# Patient Record
Sex: MALE | Race: BLACK OR AFRICAN AMERICAN | NOT HISPANIC OR LATINO | Employment: FULL TIME | URBAN - METROPOLITAN AREA
[De-identification: names, ages, dates, MRNs, and addresses within clinical notes are randomized per-mention and may not be internally consistent; named-entity substitution may affect disease eponyms.]

---

## 2023-01-23 PROBLEM — C79.9 METASTASIS OF UNKNOWN PRIMARY: Status: ACTIVE | Noted: 2023-01-23

## 2023-01-23 PROBLEM — R53.83 FATIGUE: Status: ACTIVE | Noted: 2023-01-23

## 2023-01-23 PROBLEM — I26.99 ACUTE PULMONARY EMBOLISM: Status: ACTIVE | Noted: 2023-01-23

## 2023-01-25 ENCOUNTER — HOSPITAL ENCOUNTER (INPATIENT)
Facility: HOSPITAL | Age: 63
LOS: 3 days | Discharge: HOME | DRG: 054 | End: 2023-01-28
Attending: EMERGENCY MEDICINE | Admitting: STUDENT IN AN ORGANIZED HEALTH CARE EDUCATION/TRAINING PROGRAM

## 2023-01-25 DIAGNOSIS — R07.9 CHEST PAIN: ICD-10-CM

## 2023-01-25 DIAGNOSIS — C79.31 BRAIN METASTASIS: Primary | ICD-10-CM

## 2023-01-25 DIAGNOSIS — I26.99 PULMONARY EMBOLISM: ICD-10-CM

## 2023-01-25 PROBLEM — R93.5 ABNORMAL CT OF THE ABDOMEN: Status: ACTIVE | Noted: 2023-01-25

## 2023-01-25 PROCEDURE — 25500020 PHARM REV CODE 255: Performed by: EMERGENCY MEDICINE

## 2023-01-25 PROCEDURE — 12000002 HC ACUTE/MED SURGE SEMI-PRIVATE ROOM

## 2023-01-25 PROCEDURE — 96374 THER/PROPH/DIAG INJ IV PUSH: CPT

## 2023-01-25 PROCEDURE — A9585 GADOBUTROL INJECTION: HCPCS | Performed by: EMERGENCY MEDICINE

## 2023-01-25 PROCEDURE — 63600175 PHARM REV CODE 636 W HCPCS: Performed by: PHYSICIAN ASSISTANT

## 2023-01-25 PROCEDURE — 99285 PR EMERGENCY DEPT VISIT,LEVEL V: ICD-10-PCS | Mod: ,,, | Performed by: PHYSICIAN ASSISTANT

## 2023-01-25 PROCEDURE — 99285 EMERGENCY DEPT VISIT HI MDM: CPT | Mod: 25

## 2023-01-25 PROCEDURE — 99285 EMERGENCY DEPT VISIT HI MDM: CPT | Mod: ,,, | Performed by: PHYSICIAN ASSISTANT

## 2023-01-25 RX ORDER — GADOBUTROL 604.72 MG/ML
10 INJECTION INTRAVENOUS
Status: COMPLETED | OUTPATIENT
Start: 2023-01-25 | End: 2023-01-25

## 2023-01-25 RX ORDER — LEVETIRACETAM 500 MG/5ML
1000 INJECTION, SOLUTION, CONCENTRATE INTRAVENOUS
Status: COMPLETED | OUTPATIENT
Start: 2023-01-25 | End: 2023-01-25

## 2023-01-25 RX ADMIN — GADOBUTROL 10 ML: 604.72 INJECTION INTRAVENOUS at 10:01

## 2023-01-25 RX ADMIN — LEVETIRACETAM 1000 MG: 100 INJECTION, SOLUTION INTRAVENOUS at 09:01

## 2023-01-25 NOTE — Clinical Note
Diagnosis: Brain metastasis [652133]   Admitting Provider:: PAU DAMON [[id number]]   Future Attending Provider: PAU DAMON [[id number]]   Reason for IP Medical Treatment  (Clinical interventions that can only be accomplished in the IP setting? ) :: new diagnosis of metastatic Ca, pulmonary artery thromboembolism and IVC thrombosis   Estimated Length of Stay:: 2 midnights   I certify that Inpatient services for greater than or equal to 2 midnights are medically necessary:: Yes   Plans for Post-Acute care--if anticipated (pick the single best option):: A. No post acute care anticipated at this time   Special Needs:: No Special Needs [1]

## 2023-01-26 PROBLEM — G93.6 VASOGENIC BRAIN EDEMA: Status: ACTIVE | Noted: 2023-01-26

## 2023-01-26 PROBLEM — C80.1 METASTASIS TO BRAIN OF UNKNOWN ORIGIN: Status: ACTIVE | Noted: 2023-01-26

## 2023-01-26 PROBLEM — C79.9 METASTASIS OF UNKNOWN PRIMARY: Status: ACTIVE | Noted: 2023-01-26

## 2023-01-26 PROBLEM — I82.220 IVC THROMBOSIS: Status: ACTIVE | Noted: 2023-01-26

## 2023-01-26 PROBLEM — C79.31 METASTASIS TO BRAIN OF UNKNOWN ORIGIN: Status: ACTIVE | Noted: 2023-01-26

## 2023-01-26 PROBLEM — I82.422: Status: ACTIVE | Noted: 2023-01-26

## 2023-01-26 LAB
ALBUMIN SERPL BCP-MCNC: 3.2 G/DL (ref 3.5–5.2)
ALP SERPL-CCNC: 80 U/L (ref 55–135)
ALT SERPL W/O P-5'-P-CCNC: 17 U/L (ref 10–44)
ANION GAP SERPL CALC-SCNC: 10 MMOL/L (ref 8–16)
AST SERPL-CCNC: 20 U/L (ref 10–40)
BACTERIA #/AREA URNS AUTO: NORMAL /HPF
BASOPHILS # BLD AUTO: 0.02 K/UL (ref 0–0.2)
BASOPHILS NFR BLD: 0.2 % (ref 0–1.9)
BILIRUB SERPL-MCNC: 0.6 MG/DL (ref 0.1–1)
BILIRUB UR QL STRIP: NEGATIVE
BUN SERPL-MCNC: 16 MG/DL (ref 8–23)
CA-I BLDV-SCNC: 1.25 MMOL/L (ref 1.06–1.42)
CALCIUM SERPL-MCNC: 10.1 MG/DL (ref 8.7–10.5)
CHLORIDE SERPL-SCNC: 105 MMOL/L (ref 95–110)
CK SERPL-CCNC: 191 U/L (ref 20–200)
CLARITY UR REFRACT.AUTO: CLEAR
CO2 SERPL-SCNC: 22 MMOL/L (ref 23–29)
COLOR UR AUTO: YELLOW
CREAT SERPL-MCNC: 1 MG/DL (ref 0.5–1.4)
DIFFERENTIAL METHOD: ABNORMAL
EOSINOPHIL # BLD AUTO: 0 K/UL (ref 0–0.5)
EOSINOPHIL NFR BLD: 0 % (ref 0–8)
ERYTHROCYTE [DISTWIDTH] IN BLOOD BY AUTOMATED COUNT: 14.2 % (ref 11.5–14.5)
EST. GFR  (NO RACE VARIABLE): >60 ML/MIN/1.73 M^2
GLUCOSE SERPL-MCNC: 134 MG/DL (ref 70–110)
GLUCOSE UR QL STRIP: NEGATIVE
HCT VFR BLD AUTO: 36.8 % (ref 40–54)
HGB BLD-MCNC: 11.5 G/DL (ref 14–18)
HGB UR QL STRIP: NEGATIVE
IMM GRANULOCYTES # BLD AUTO: 0.05 K/UL (ref 0–0.04)
IMM GRANULOCYTES NFR BLD AUTO: 0.6 % (ref 0–0.5)
KETONES UR QL STRIP: NEGATIVE
LDH SERPL L TO P-CCNC: 273 U/L (ref 110–260)
LEUKOCYTE ESTERASE UR QL STRIP: ABNORMAL
LYMPHOCYTES # BLD AUTO: 0.6 K/UL (ref 1–4.8)
LYMPHOCYTES NFR BLD: 6.9 % (ref 18–48)
MAGNESIUM SERPL-MCNC: 1.8 MG/DL (ref 1.6–2.6)
MCH RBC QN AUTO: 29.2 PG (ref 27–31)
MCHC RBC AUTO-ENTMCNC: 31.3 G/DL (ref 32–36)
MCV RBC AUTO: 93 FL (ref 82–98)
MICROSCOPIC COMMENT: NORMAL
MONOCYTES # BLD AUTO: 0.2 K/UL (ref 0.3–1)
MONOCYTES NFR BLD: 2.2 % (ref 4–15)
NEUTROPHILS # BLD AUTO: 7.7 K/UL (ref 1.8–7.7)
NEUTROPHILS NFR BLD: 90.1 % (ref 38–73)
NITRITE UR QL STRIP: NEGATIVE
NRBC BLD-RTO: 0 /100 WBC
PATH REV BLD -IMP: NORMAL
PH UR STRIP: 6 [PH] (ref 5–8)
PHOSPHATE SERPL-MCNC: 2.9 MG/DL (ref 2.7–4.5)
PLATELET # BLD AUTO: 315 K/UL (ref 150–450)
PMV BLD AUTO: 10.1 FL (ref 9.2–12.9)
POTASSIUM SERPL-SCNC: 4.7 MMOL/L (ref 3.5–5.1)
PROCALCITONIN SERPL IA-MCNC: 0.04 NG/ML
PROT SERPL-MCNC: 7.9 G/DL (ref 6–8.4)
PROT UR QL STRIP: ABNORMAL
PTH-INTACT SERPL-MCNC: 34.2 PG/ML (ref 9–77)
RBC # BLD AUTO: 3.94 M/UL (ref 4.6–6.2)
RBC #/AREA URNS AUTO: 2 /HPF (ref 0–4)
SODIUM SERPL-SCNC: 137 MMOL/L (ref 136–145)
SP GR UR STRIP: 1.03 (ref 1–1.03)
URATE SERPL-MCNC: 4.8 MG/DL (ref 3.4–7)
URN SPEC COLLECT METH UR: ABNORMAL
WBC # BLD AUTO: 8.5 K/UL (ref 3.9–12.7)
WBC #/AREA URNS AUTO: 5 /HPF (ref 0–5)

## 2023-01-26 PROCEDURE — 85060 PATHOLOGIST REVIEW: ICD-10-PCS | Mod: ,,, | Performed by: PATHOLOGY

## 2023-01-26 PROCEDURE — 80053 COMPREHEN METABOLIC PANEL: CPT | Performed by: HOSPITALIST

## 2023-01-26 PROCEDURE — 93010 ELECTROCARDIOGRAM REPORT: CPT | Mod: ,,, | Performed by: INTERNAL MEDICINE

## 2023-01-26 PROCEDURE — 81001 URINALYSIS AUTO W/SCOPE: CPT | Performed by: HOSPITALIST

## 2023-01-26 PROCEDURE — 83735 ASSAY OF MAGNESIUM: CPT | Performed by: HOSPITALIST

## 2023-01-26 PROCEDURE — 99232 PR SUBSEQUENT HOSPITAL CARE,LEVL II: ICD-10-PCS | Mod: ,,, | Performed by: PHYSICIAN ASSISTANT

## 2023-01-26 PROCEDURE — 99232 SBSQ HOSP IP/OBS MODERATE 35: CPT | Mod: ,,, | Performed by: PHYSICIAN ASSISTANT

## 2023-01-26 PROCEDURE — 99223 1ST HOSP IP/OBS HIGH 75: CPT | Mod: ,,, | Performed by: HOSPITALIST

## 2023-01-26 PROCEDURE — 99223 1ST HOSP IP/OBS HIGH 75: CPT | Mod: ,,, | Performed by: PHYSICIAN ASSISTANT

## 2023-01-26 PROCEDURE — 85025 COMPLETE CBC W/AUTO DIFF WBC: CPT | Performed by: HOSPITALIST

## 2023-01-26 PROCEDURE — 82330 ASSAY OF CALCIUM: CPT | Performed by: HOSPITALIST

## 2023-01-26 PROCEDURE — 84100 ASSAY OF PHOSPHORUS: CPT | Performed by: HOSPITALIST

## 2023-01-26 PROCEDURE — 63600175 PHARM REV CODE 636 W HCPCS: Performed by: HOSPITALIST

## 2023-01-26 PROCEDURE — 82550 ASSAY OF CK (CPK): CPT | Performed by: HOSPITALIST

## 2023-01-26 PROCEDURE — 93010 EKG 12-LEAD: ICD-10-PCS | Mod: ,,, | Performed by: INTERNAL MEDICINE

## 2023-01-26 PROCEDURE — 93005 ELECTROCARDIOGRAM TRACING: CPT

## 2023-01-26 PROCEDURE — 84145 PROCALCITONIN (PCT): CPT | Performed by: HOSPITALIST

## 2023-01-26 PROCEDURE — 82105 ALPHA-FETOPROTEIN SERUM: CPT | Performed by: STUDENT IN AN ORGANIZED HEALTH CARE EDUCATION/TRAINING PROGRAM

## 2023-01-26 PROCEDURE — 83615 LACTATE (LD) (LDH) ENZYME: CPT | Performed by: HOSPITALIST

## 2023-01-26 PROCEDURE — 25000003 PHARM REV CODE 250: Performed by: HOSPITALIST

## 2023-01-26 PROCEDURE — 85025 COMPLETE CBC W/AUTO DIFF WBC: CPT | Mod: 91 | Performed by: STUDENT IN AN ORGANIZED HEALTH CARE EDUCATION/TRAINING PROGRAM

## 2023-01-26 PROCEDURE — 85060 BLOOD SMEAR INTERPRETATION: CPT | Mod: ,,, | Performed by: PATHOLOGY

## 2023-01-26 PROCEDURE — 99223 PR INITIAL HOSPITAL CARE,LEVL III: ICD-10-PCS | Mod: ,,, | Performed by: HOSPITALIST

## 2023-01-26 PROCEDURE — 11000001 HC ACUTE MED/SURG PRIVATE ROOM

## 2023-01-26 PROCEDURE — 99223 PR INITIAL HOSPITAL CARE,LEVL III: ICD-10-PCS | Mod: ,,, | Performed by: PHYSICIAN ASSISTANT

## 2023-01-26 PROCEDURE — 84550 ASSAY OF BLOOD/URIC ACID: CPT | Performed by: HOSPITALIST

## 2023-01-26 PROCEDURE — 83970 ASSAY OF PARATHORMONE: CPT | Performed by: HOSPITALIST

## 2023-01-26 PROCEDURE — 84702 CHORIONIC GONADOTROPIN TEST: CPT | Performed by: STUDENT IN AN ORGANIZED HEALTH CARE EDUCATION/TRAINING PROGRAM

## 2023-01-26 RX ORDER — PROCHLORPERAZINE EDISYLATE 5 MG/ML
5 INJECTION INTRAMUSCULAR; INTRAVENOUS EVERY 6 HOURS PRN
Status: DISCONTINUED | OUTPATIENT
Start: 2023-01-26 | End: 2023-01-28 | Stop reason: HOSPADM

## 2023-01-26 RX ORDER — OXYCODONE HYDROCHLORIDE 10 MG/1
10 TABLET ORAL EVERY 6 HOURS PRN
Status: DISCONTINUED | OUTPATIENT
Start: 2023-01-26 | End: 2023-01-28 | Stop reason: HOSPADM

## 2023-01-26 RX ORDER — IBUPROFEN 200 MG
16 TABLET ORAL
Status: DISCONTINUED | OUTPATIENT
Start: 2023-01-26 | End: 2023-01-28 | Stop reason: HOSPADM

## 2023-01-26 RX ORDER — DEXAMETHASONE 4 MG/1
4 TABLET ORAL EVERY 6 HOURS
Status: DISCONTINUED | OUTPATIENT
Start: 2023-01-26 | End: 2023-01-28 | Stop reason: HOSPADM

## 2023-01-26 RX ORDER — SODIUM CHLORIDE 0.9 % (FLUSH) 0.9 %
10 SYRINGE (ML) INJECTION EVERY 6 HOURS PRN
Status: DISCONTINUED | OUTPATIENT
Start: 2023-01-26 | End: 2023-01-28 | Stop reason: HOSPADM

## 2023-01-26 RX ORDER — NALOXONE HCL 0.4 MG/ML
0.02 VIAL (ML) INJECTION
Status: DISCONTINUED | OUTPATIENT
Start: 2023-01-26 | End: 2023-01-28 | Stop reason: HOSPADM

## 2023-01-26 RX ORDER — ACETAMINOPHEN 325 MG/1
650 TABLET ORAL EVERY 4 HOURS PRN
Status: DISCONTINUED | OUTPATIENT
Start: 2023-01-26 | End: 2023-01-28 | Stop reason: HOSPADM

## 2023-01-26 RX ORDER — ENOXAPARIN SODIUM 100 MG/ML
1 INJECTION SUBCUTANEOUS
Status: DISCONTINUED | OUTPATIENT
Start: 2023-01-26 | End: 2023-01-28 | Stop reason: HOSPADM

## 2023-01-26 RX ORDER — POLYETHYLENE GLYCOL 3350 17 G/17G
17 POWDER, FOR SOLUTION ORAL 2 TIMES DAILY PRN
Status: DISCONTINUED | OUTPATIENT
Start: 2023-01-26 | End: 2023-01-28 | Stop reason: HOSPADM

## 2023-01-26 RX ORDER — ONDANSETRON 2 MG/ML
4 INJECTION INTRAMUSCULAR; INTRAVENOUS EVERY 8 HOURS PRN
Status: DISCONTINUED | OUTPATIENT
Start: 2023-01-26 | End: 2023-01-28 | Stop reason: HOSPADM

## 2023-01-26 RX ORDER — IBUPROFEN 200 MG
24 TABLET ORAL
Status: DISCONTINUED | OUTPATIENT
Start: 2023-01-26 | End: 2023-01-28 | Stop reason: HOSPADM

## 2023-01-26 RX ORDER — LEVETIRACETAM 500 MG/1
500 TABLET ORAL 2 TIMES DAILY
Status: DISCONTINUED | OUTPATIENT
Start: 2023-01-26 | End: 2023-01-28 | Stop reason: HOSPADM

## 2023-01-26 RX ORDER — AMOXICILLIN 250 MG
1 CAPSULE ORAL DAILY PRN
Status: DISCONTINUED | OUTPATIENT
Start: 2023-01-26 | End: 2023-01-28 | Stop reason: HOSPADM

## 2023-01-26 RX ORDER — FAMOTIDINE 20 MG/1
20 TABLET, FILM COATED ORAL
Status: DISCONTINUED | OUTPATIENT
Start: 2023-01-26 | End: 2023-01-28 | Stop reason: HOSPADM

## 2023-01-26 RX ORDER — TALC
6 POWDER (GRAM) TOPICAL NIGHTLY PRN
Status: DISCONTINUED | OUTPATIENT
Start: 2023-01-26 | End: 2023-01-28 | Stop reason: HOSPADM

## 2023-01-26 RX ORDER — IPRATROPIUM BROMIDE AND ALBUTEROL SULFATE 2.5; .5 MG/3ML; MG/3ML
3 SOLUTION RESPIRATORY (INHALATION) EVERY 4 HOURS PRN
Status: DISCONTINUED | OUTPATIENT
Start: 2023-01-26 | End: 2023-01-28 | Stop reason: HOSPADM

## 2023-01-26 RX ORDER — GLUCAGON 1 MG
1 KIT INJECTION
Status: DISCONTINUED | OUTPATIENT
Start: 2023-01-26 | End: 2023-01-28 | Stop reason: HOSPADM

## 2023-01-26 RX ADMIN — DEXAMETHASONE 4 MG: 4 TABLET ORAL at 01:01

## 2023-01-26 RX ADMIN — DEXAMETHASONE 4 MG: 4 TABLET ORAL at 05:01

## 2023-01-26 RX ADMIN — LEVETIRACETAM 500 MG: 500 TABLET, FILM COATED ORAL at 09:01

## 2023-01-26 RX ADMIN — ENOXAPARIN SODIUM 90 MG: 100 INJECTION SUBCUTANEOUS at 01:01

## 2023-01-26 RX ADMIN — DEXAMETHASONE 4 MG: 4 TABLET ORAL at 12:01

## 2023-01-26 RX ADMIN — ENOXAPARIN SODIUM 90 MG: 100 INJECTION SUBCUTANEOUS at 04:01

## 2023-01-26 RX ADMIN — FAMOTIDINE 20 MG: 20 TABLET ORAL at 05:01

## 2023-01-26 RX ADMIN — FAMOTIDINE 20 MG: 20 TABLET ORAL at 04:01

## 2023-01-26 NOTE — SUBJECTIVE & OBJECTIVE
History reviewed. No pertinent past medical history.    History reviewed. No pertinent surgical history.    Review of patient's allergies indicates:  No Known Allergies    Current Facility-Administered Medications on File Prior to Encounter   Medication    [COMPLETED] dexAMETHasone injection 8 mg    [DISCONTINUED] acetaminophen tablet 650 mg    [DISCONTINUED] ondansetron injection 4 mg    [DISCONTINUED] sodium chloride 0.9% flush 10 mL    [DISCONTINUED] tuberculin injection 5 Units     No current outpatient medications on file prior to encounter.     Family History       Problem Relation (Age of Onset)    Diverticulitis Mother    Prostate cancer Brother          Tobacco Use    Smoking status: Former     Packs/day: 0.00     Types: Cigarettes    Smokeless tobacco: Never    Tobacco comments:     Rare cigarette use in his teens.   Substance and Sexual Activity    Alcohol use: Yes     Alcohol/week: 6.0 standard drinks     Types: 6 Standard drinks or equivalent per week    Drug use: Not Currently     Types: Marijuana     Comment: THC in his teens    Sexual activity: Not on file     Review of Systems   Constitutional:  Negative for unexpected weight change.   HENT:  Negative for trouble swallowing and voice change.    Eyes: Negative.  Negative for visual disturbance.   Cardiovascular: Negative.    Gastrointestinal: Negative.    Genitourinary: Negative.    Skin:  Negative for rash.   Neurological:  Negative for dizziness, speech difficulty and numbness.   Hematological:  Negative for adenopathy. Does not bruise/bleed easily.   Psychiatric/Behavioral:  Negative for agitation and confusion.    All other systems reviewed and are negative.  Objective:     Vital Signs (Most Recent):  Temp: 99.2 °F (37.3 °C) (01/25/23 2025)  Pulse: 82 (01/26/23 0030)  Resp: 20 (01/26/23 0030)  BP: 129/72 (01/26/23 0030)  SpO2: 96 % (01/26/23 0030) Vital Signs (24h Range):  Temp:  [97.8 °F (36.6 °C)-99.2 °F (37.3 °C)] 99.2 °F (37.3 °C)  Pulse:   [81-96] 82  Resp:  [16-20] 20  SpO2:  [95 %-98 %] 96 %  BP: (120-181)/(69-95) 129/72     Weight: 90.7 kg (200 lb)  Body mass index is 31.32 kg/m².    Physical Exam  Vitals and nursing note reviewed.   Constitutional:       General: He is not in acute distress.     Appearance: He is not ill-appearing.      Comments: Some temporal wasting   HENT:      Head: Normocephalic and atraumatic.      Comments: Left frontal subcutaneous nodule     Mouth/Throat:      Pharynx: Oropharynx is clear. No oropharyngeal exudate.      Comments: Missing teeth  Eyes:      General: No scleral icterus.     Extraocular Movements: Extraocular movements intact.      Pupils: Pupils are equal, round, and reactive to light.      Comments: No nystagmus   Cardiovascular:      Rate and Rhythm: Normal rate and regular rhythm.      Pulses: Normal pulses.      Heart sounds: Murmur heard.   Pulmonary:      Effort: Pulmonary effort is normal. No respiratory distress.      Comments: Some coarse expiratory breath sounds bilaterally decreased breath sounds at the bases  Abdominal:      General: There is no distension.      Palpations: Abdomen is soft.      Tenderness: There is no abdominal tenderness. There is no guarding.   Musculoskeletal:      Cervical back: Neck supple. No tenderness.      Right lower leg: No edema.      Left lower leg: No edema.   Lymphadenopathy:      Cervical: No cervical adenopathy.   Skin:     General: Skin is warm and dry.   Neurological:      General: No focal deficit present.      Mental Status: He is alert and oriented to person, place, and time.      Comments: No asterixis   Psychiatric:         Mood and Affect: Mood normal.         Behavior: Behavior normal.         CRANIAL NERVES     CN III, IV, VI   Pupils are equal, round, and reactive to light.     Significant Labs: All pertinent labs within the past 24 hours have been reviewed.  CBC:   Recent Labs   Lab 01/25/23  1518   WBC 8.36   HGB 13.2*   HCT 42.0        CMP:    Recent Labs   Lab 01/25/23  1518      K 4.1      CO2 24   GLU 87   BUN 18   CREATININE 1.2   CALCIUM 9.8   PROT 8.2   ALBUMIN 3.3*   BILITOT 0.7   ALKPHOS 75   AST 26   ALT 20   ANIONGAP 12     Cardiac Markers: No results for input(s): CKMB, MYOGLOBIN, BNP, TROPISTAT in the last 48 hours.  Coagulation:   Recent Labs   Lab 01/25/23  1518   INR 1.1     Lactic Acid: No results for input(s): LACTATE in the last 48 hours.  Lipase: No results for input(s): LIPASE in the last 48 hours.  Magnesium: No results for input(s): MG in the last 48 hours.  Troponin: No results for input(s): TROPONINI, TROPONINIHS in the last 48 hours.    Significant Imaging: I have reviewed all pertinent imaging results/findings within the past 24 hours.    MRI BRAIN W WO CONTRAST     CLINICAL HISTORY:  Brain metastases suspected;.     TECHNIQUE:  Multiplanar multisequence MR imaging of the brain was performed before and after the administration of 10 mL Gadavist  intravenous contrast.     COMPARISON:  CT 01/25/2023     FINDINGS:  Intracranial compartment:     No midline shift or hydrocephalus.  No extra-axial blood or fluid collections.     There are multiple bilateral metastatic foci involving the cerebrum and cerebellum.  Some are homogeneous enhancing a others are heterogeneous with ring enhancement.  The largest measures approximately 3.7 x 3.1 cm in the right temporal lobe on axial 12 of series 14..  There are lesions within the bilateral frontal lobes, bilateral temporal lobes, bilateral parietal lobes and bilateral cerebellum.     There are multiple peripheral soft tissue masses in the scalp and posterior paraspinal musculature in the upper neck with associated enhancement suggesting metastatic disease.     No acute hemorrhage is detected.  No evidence of acute major vascular infarction.     There is mild associated vasogenic edema adjacent to the lesion the right temporal lobe right parietal lobe near the apex and minimally  left temporal lobe and bilateral cerebellum.     Small probable remote lacunar infarct of the centrum semiovale on the right.     Mild involutional changes and chronic microvascular ischemic changes in the periventricular white matter.     Normal vascular flow voids are preserved.     Skull/extracranial contents (limited evaluation): Bone marrow signal intensity is normal.     Impression:     1. Multiple cerebral and cerebellar masses consistent with metastatic disease.  The largest is in the right temporal lobe measuring approximately 3.7 cm.  Mild associated vasogenic edema.  Follow-up recommended.  2. Multiple small enhancing masses in the scalp and posterior paraspinal musculature in the upper neck suggesting metastatic disease.  3. Involutional changes chronic microvascular ischemic changes and small remote lacunar infarct of the right centrum semiovale.  4.  This report was flagged in Epic as abnormal.        Electronically signed by: Javier Garland  Date:                                            01/25/2023  Time:                                           23:42    CT CHEST ABDOMEN PELVIS WITH CONTRAST (XPD)     CLINICAL HISTORY:  Metastatic disease evaluation;     TECHNIQUE:  Axial images were obtained of the chest, abdomen and pelvis from the thoracic inlet through the symphysis pubis after the administration intravenous contrast.     COMPARISON:  No relevant prior imaging examinations are available for correlation.     FINDINGS:  Mediastinum, hilar and axillary regions:     There are moderately prominent lymph nodes in the mediastinum and hilar regions.  There are no pleural or pericardial effusions.     There is low-density thrombus in a segmental branch of the left lower lobe pulmonary artery as seen on axial image 45 and series 2.     There are multiple soft tissue density masses in the subcutaneous fat of the chest, the largest of which is in the right anterior chest wall and measures 2.2 cm in  greatest dimension.     Lungs:     There are numerable soft tissue density masses throughout the lungs bilaterally.     Abdomen and pelvis:     There are multiple low-density masses involving the liver suggestive of metastatic disease.  A small low-density lesion is noted in the posterior aspect of the upper portion of the spleen.     The pancreas, gallbladder and adrenal glands are unremarkable.  There are multiple masses in the kidneys bilaterally the largest of which measures 3.6 cm in the left kidney.     There is low-density thrombus in the inferior vena cava near the origin of the left common iliac vein as seen on axial image 44 and series 3.     There is a large heterogeneous mass in the central portion of the pelvis measuring 12 cm in greatest dimension and displaces the urinary bladder anteriorly.  The appendix is normal.     There are multiple enlarged lymph nodes in the mesentery measuring up to 3.6 cm in greatest dimension.     Multiple masses are noted in the subcutaneous fat of the abdomen and pelvis.  Phleboliths are noted in the pelvis.     There are masses in the left ischial rectal fossa as well as in the perirectal region.     Delayed images:     There is contrast excretion into both renal pelvocaliceal systems and contrast accumulation in the urinary bladder.     Osseous structures:     There are lytic lesions involving the right 3rd, 7th and 11th ribs and the L2, L4 and L5 vertebrae as well as the medial aspect of the left iliac bone lateral to the inferior margin of the sacroiliac joint..     An additional lytic lesion is noted involving the medial aspect of the right scapula.     Impression:     Multiple masses in the lungs and in the soft tissues of the chest, abdomen and pelvis suggestive of metastatic disease.     Large heterogeneous mass in the central portion of the pelvis measuring 12 cm suggestive of a neoplasm.     Enlarged lymph nodes in the mesentery and moderately enlarged lymph  nodes in the mediastinal and hilar regions and the perirectal region as well as in the left ischiorectal fossa.     Low-density thrombus in a segmental branch of the left lower lobe pulmonary artery as well as in the inferior vena cava near the origin of the left common iliac vein.     Masses in the kidneys and liver suggestive of metastatic disease.     Multiple lytic lesions in the axial and appendicular skeleton suggestive of metastatic disease.     This report was called to the emergency department physician assistant at 17:21 on January 23, 2023.        Electronically signed by: Domingo Rodriguez MD  Date:                                            01/23/2023  Time:                                           17:24

## 2023-01-26 NOTE — PROGRESS NOTES
[facility] - Emergency Dept  Neurosurgery  Progress Note    Subjective:     History of Present Illness: Pt is a 63M no past medical history presents with chronic fatigue and weight loss to outside hospital. Workup showed significant metastatic tumor burden and CTH showed multiple brain metastases. Pt is neurologically intact, transferred to [facility] for further workup and evaluation.      Interval History:NAEON. AFVSS. Denies hx of seizures, headache, pain, changes in vision or speech. Endorses only feeling of fatigue, but not shortness of breath or trouble breathing. No numbness/tingling. Endorses understanding of tumor burden and next steps to get a proper diagnosis, pending biopsy.    Medications:  Continuous Infusions:  Scheduled Meds:   dexAMETHasone  4 mg Oral Q6H    enoxaparin  1 mg/kg Subcutaneous Q12H    famotidine  20 mg Oral BID AC    levETIRAcetam  500 mg Oral BID     PRN Meds:acetaminophen, albuterol-ipratropium, dextrose 10%, dextrose 10%, glucagon (human recombinant), glucose, glucose, melatonin, naloxone, ondansetron, oxyCODONE, polyethylene glycol, prochlorperazine, senna-docusate 8.6-50 mg, sodium chloride 0.9%     Review of Systems   Constitutional:  Positive for fatigue and unexpected weight change. Negative for chills and fever.   Eyes:  Negative for photophobia and visual disturbance.   Respiratory:  Negative for cough and shortness of breath.    Cardiovascular:  Negative for chest pain and palpitations.   Gastrointestinal:  Negative for nausea and vomiting.   Neurological:  Negative for seizures, speech difficulty, weakness, numbness and headaches.   Psychiatric/Behavioral:  Negative for agitation and confusion.    Objective:     Weight: 90.7 kg (200 lb)  Body mass index is 31.32 kg/m².  Vital Signs (Most Recent):  Temp: 98.8 °F (37.1 °C) (01/26/23 0906)  Pulse: 82 (01/26/23 0906)  Resp: 18 (01/26/23 0906)  BP: 126/77 (01/26/23 0906)  SpO2: 96 % (01/26/23 0906) Vital Signs (24h Range):  Temp:  [97.8  "°F (36.6 °C)-99.2 °F (37.3 °C)] 98.8 °F (37.1 °C)  Pulse:  [77-96] 82  Resp:  [16-20] 18  SpO2:  [95 %-99 %] 96 %  BP: (120-181)/(69-95) 126/77                   Neurosurgery Physical Exam    General: well developed, no distress  Head: normocephalic, atraumatic  Pulmonary: normal respirations, no signs of respiratory distress  Abdomen: soft, non-distended  Skin: skin is warm, dry and intact  E4V5M6  Awake, alert, Ox4  PERRLA, EOMI  CNII-XII grossly intact  Motor: follows commands full strength x4  SILT  No drift or dysmetria    Significant Labs:  Recent Labs   Lab 01/25/23  1518 01/26/23  0445   GLU 87 134*    137   K 4.1 4.7    105   CO2 24 22*   BUN 18 16   CREATININE 1.2 1.0   CALCIUM 9.8 10.1   MG  --  1.8     Recent Labs   Lab 01/25/23  1518 01/26/23  0445   WBC 8.36 8.50   HGB 13.2* 11.5*   HCT 42.0 36.8*    315     Recent Labs   Lab 01/25/23  1518   INR 1.1     Microbiology Results (last 7 days)       ** No results found for the last 168 hours. **          All pertinent labs from the last 24 hours have been reviewed.    Significant Diagnostics:  I have reviewed all pertinent imaging results/findings within the past 24 hours.  Assessment/Plan:     * Multiple lesions of metastatic malignancy  Pt is a 63M no past medical history presents with chronic fatigue and weight loss to outside hospital. Workup showed significant metastatic tumor burden and CTH showed multiple brain metastases. Pt is neurologically intact, transferred to [facility] for further workup and evaluation.    Admitted to  service 1/25    MRI brain w w/o contrast reviewed, showed "Multiple cerebral and cerebellar masses consistent with metastatic disease.  The largest is in the right temporal lobe measuring approximately 3.7 cm.  Mild associated vasogenic edema. Multiple small enhancing masses in the scalp and posterior paraspinal musculature in the upper neck suggesting metastatic disease. Involutional changes chronic microvascular " "ischemic changes and small remote lacunar infarct of the right centrum semiovale".     Plan:  Q4h neurochecks  SBP<160, HOB>30  Loaded with Dex, continue 4q6  Loaded with Keppra, continue 500 BID  Given number of brain lesions, likely recommend whole brain radiation treatment. Surgery not recommended at this time. Recommend Rad/Onc consult for possible radiation treatment.  Recommend oncology consult and preferred route for obtaining biopsy  Recommend IR consult for biopsy as well        Joycelyn Tavarez PA-C  Neurosurgery  [facility] - Emergency Dept  "

## 2023-01-26 NOTE — HPI
"Patient is a 63 year-old male with no known PMH who presents to [facility] [place] as a transfer following new findings at OSH concerning for metastatic CNS Disease requiring a higher level of care. The patient originally presented to [facility] on 1/23/23 for a clinical presentation of faigue, progressive weakness, cough, and overall change in performance status. Further evaluation with CXR was concerning for "Innumerable nodular opacities throughout both lungs very concerning for metastatic disease to lungs." Furhter work-up was obtained with CT C/A/P which was consistent with malignancy of unknown primary with "Multiple masses in the lungs and in the soft tissues of the chest, abdomen and pelvis suggestive of metastatic disease, Large heterogeneous mass in the central portion of the pelvis measuring 12 cm suggestive of a neoplasm, Enlarged lymph nodes in the mesentery and moderately enlarged lymph nodes in the mediastinal and hilar regions and the perirectal region as well as in the left ischiorectal fossa, Multiple lytic lesions in the axial and appendicular skeleton suggestive of metastatic disease." The patient ended up leaving [place] after receiving these results and presented back on 1/25/23 due to worsening fatigue and at the suggestion of his family. Due to reports of worsening VARGAS, CT Brain was obtained showing "Multiple subtle ring and nodular lesions with mild associated vasogenic edema involving the cerebrum bilaterally and cerebellum as above presumed metastatic disease to brain as seen on this noncontrast CT." The patient was transferred to [facility] and evaluated by Neurosurgery in the ED with MRI Brain ordered, and recs for Keppra and Dexamethasone for seizure PPX and edema management. MRI brain resulting with "Multiple cerebral and cerebellar masses consistent with metastatic disease.  The largest is in the right temporal lobe measuring approximately 3.7 cm.  Mild associated vasogenic edema." " Oncology consulted for assistance with work-up for Malignancy of unknown primary. At the bedside, patient is vitally stable and in no acute distress. He denies any previous history of malignancy, known toxic exposures, tobacco use, or substantial ETOH use. He was updated regarding the findings above and the overall clinical picture being malignancy of unknown primary and the incurable nature of this and was agreeable and understanding. We discussed options for identifying his malignancy in order to evaluate for any treatment options and was agreeable.

## 2023-01-26 NOTE — SUBJECTIVE & OBJECTIVE
Interval History:NAEON. AFVSS. Denies hx of seizures, headache, pain, changes in vision or speech. Endorses only feeling of fatigue, but not shortness of breath or trouble breathing. No numbness/tingling. Endorses understanding of tumor burden and next steps to get a proper diagnosis, pending biopsy.    Medications:  Continuous Infusions:  Scheduled Meds:   dexAMETHasone  4 mg Oral Q6H    enoxaparin  1 mg/kg Subcutaneous Q12H    famotidine  20 mg Oral BID AC    levETIRAcetam  500 mg Oral BID     PRN Meds:acetaminophen, albuterol-ipratropium, dextrose 10%, dextrose 10%, glucagon (human recombinant), glucose, glucose, melatonin, naloxone, ondansetron, oxyCODONE, polyethylene glycol, prochlorperazine, senna-docusate 8.6-50 mg, sodium chloride 0.9%     Review of Systems   Constitutional:  Positive for fatigue and unexpected weight change. Negative for chills and fever.   Eyes:  Negative for photophobia and visual disturbance.   Respiratory:  Negative for cough and shortness of breath.    Cardiovascular:  Negative for chest pain and palpitations.   Gastrointestinal:  Negative for nausea and vomiting.   Neurological:  Negative for seizures, speech difficulty, weakness, numbness and headaches.   Psychiatric/Behavioral:  Negative for agitation and confusion.    Objective:     Weight: 90.7 kg (200 lb)  Body mass index is 31.32 kg/m².  Vital Signs (Most Recent):  Temp: 98.8 °F (37.1 °C) (01/26/23 0906)  Pulse: 82 (01/26/23 0906)  Resp: 18 (01/26/23 0906)  BP: 126/77 (01/26/23 0906)  SpO2: 96 % (01/26/23 0906) Vital Signs (24h Range):  Temp:  [97.8 °F (36.6 °C)-99.2 °F (37.3 °C)] 98.8 °F (37.1 °C)  Pulse:  [77-96] 82  Resp:  [16-20] 18  SpO2:  [95 %-99 %] 96 %  BP: (120-181)/(69-95) 126/77                   Neurosurgery Physical Exam    General: well developed, no distress  Head: normocephalic, atraumatic  Pulmonary: normal respirations, no signs of respiratory distress  Abdomen: soft, non-distended  Skin: skin is warm, dry  and intact  E4V5M6  Awake, alert, Ox4  PERRLA, EOMI  CNII-XII grossly intact  Motor: follows commands full strength x4  SILT  No drift or dysmetria    Significant Labs:  Recent Labs   Lab 01/25/23  1518 01/26/23  0445   GLU 87 134*    137   K 4.1 4.7    105   CO2 24 22*   BUN 18 16   CREATININE 1.2 1.0   CALCIUM 9.8 10.1   MG  --  1.8     Recent Labs   Lab 01/25/23  1518 01/26/23  0445   WBC 8.36 8.50   HGB 13.2* 11.5*   HCT 42.0 36.8*    315     Recent Labs   Lab 01/25/23  1518   INR 1.1     Microbiology Results (last 7 days)       ** No results found for the last 168 hours. **          All pertinent labs from the last 24 hours have been reviewed.    Significant Diagnostics:  I have reviewed all pertinent imaging results/findings within the past 24 hours.

## 2023-01-26 NOTE — ED NOTES
LOC: The patient is awake, alert, and oriented to self, place, time, and situation. Pt is calm and cooperative. Affect is appropriate.  Speech is appropriate and clear.     APPEARANCE: Patient resting comfortably in no acute distress.  Patient is clean and well groomed.    SKIN: The skin is warm and dry; color consistent with ethnicity.  Patient has normal skin turgor and moist mucus membranes.  Skin intact; no breakdown or bruising noted.     MUSCULOSKELETAL: Patient moving upper and lower extremities without difficulty; denies pain in the extremities or back.  Denies weakness.     RESPIRATORY: Airway is open and patent. Respirations spontaneous, even, easy, and non-labored.  Patient has a normal effort and rate.  No accessory muscle use noted. Denies cough.     CARDIAC:   No peripheral edema noted. No complaints of chest pain.      ABDOMEN: Soft and non tender to palpation.  No distention noted. Pt denies abdominal pain; denies nausea, vomiting, diarrhea, or constipation.    NEUROLOGIC: Eyes open spontaneously.  Behavior appropriate to situation.  Follows commands; facial expression symmetrical.  Purposeful motor response noted; normal sensation in all extremities. Pt denies headache; denies lightheadedness or dizziness; denies visual disturbances; denies loss of balance; denies unilateral weakness.

## 2023-01-26 NOTE — ASSESSMENT & PLAN NOTE
Seen by neurosurgery  -no operative intervention recommended  -no focal neurologic deficits noted   -continue Dexamethasone 4mg PO q6hrs   >famotidine stress ulcer prophylaxis  -continue Keppra 500mg PO BID as seizure prophylaxis, fall/seizure precautions

## 2023-01-26 NOTE — HPI
Pt is a 63M no past medical history presents with chronic fatigue and weight loss to outside hospital. Workup showed significant metastatic tumor burden and CTH showed multiple brain metastases. Pt is neurologically intact, transferred to [facility] for further workup and evaluation.

## 2023-01-26 NOTE — ASSESSMENT & PLAN NOTE
Presented as noted above on 1/23/ & 1/25 with findings of metastatic disease of unknown primary  Transferred for higher level of care in setting of CNS metastatic disease  No previous history of malignancy per patient history  Broad differential at this time extent of metastatic disease and various locations  Recommendations:  -Follow-up neurosurgery recs, no plans for surgery at this time  -Follow-up Rad/Onc recs for WBRT evaluation  -Patient with diffuse pulmonary disease, lytic lesions, as well as 12 cm pelvic mass that could be amendable to biopsy, IR consulted. Would ideally have prior to DC to expedite care  -CEA, CA-19-9, PSA, AFP, B-HCG ordered for initial evaluation  -Oncology will continue to follow pending pathology and can assist with discharge follow-up once primary identified  -Patient updated regarding overall picture of metastatic disease of unknown primary and the incurable nature of this and was understanding, wishes to proceed with further evaluation  -Symptomatic management per primary team

## 2023-01-26 NOTE — H&P
[facility] - Emergency Dept  [facility]  History & Physical    Patient Name: Daryle Lynn Howard  MRN: [id number]  Patient Class: IP- Inpatient  Admission Date: 1/25/2023  Attending Physician: Ba Rainey* [facility]  Admitting Physician: Demian Edmond MD  Primary Care Provider: No primary care provider on file.         Patient information was obtained from patient, past medical records and ER records.     Subjective:     Principal Problem:Multiple lesions of metastatic malignancy    Chief Complaint:   Chief Complaint   Patient presents with    Transfer     Transfer from [facility] for neuro consult. Feeling fatigue since christmas. CT abdomen/pelvis/brain shows metastatic CA. No hx. Alert/oriented x 4        HPI: 63-year-old man with no chronic medical history not receiving outpatient medical care for last 20 years presents as a transfer from [facility] for concerns of metastatic malignancy of unknown primary with brain metastases with vasogenic edema.     He was initially seen January 23rd in the ED department at [facility], presenting with complaints of 1 month of fatigue, noted to have rales on pulmonary exam initial chest x-ray showed innumerable pulmonary masses and he had a subsequent CT chest abdomen pelvis that has revealed numerous pulmonary lesions, as well as abdominal lesions, lytic bone lesions and a large pelvic mass all concerning for signs of a metastatic malignancy, and 3 areas of venous thromboembolism.  He had no prior surgeries, no prior history is of a malignancy diagnosis, he lives alone and works at [employer].  No history of tob,acco use or smoking, drinking approximately 6 alcoholic drinks per week and denies any history of drug use, no prior  service no report of chemical exposures and or asbestos exposures in his working career.    He was plan for admission but ultimately left against medical advise for personal reasons and then re-presented to the  emergency department there in home today and a CT head was completed which showed brain lesions with associated vasogenic edema and a and a transfer was requested and Neurosurgery recommended the ED to ED transfer.    On discussion with patient besides fatigue and occasional cough and some shortness of breath with exertion that is been occurring since [place] he denies any other symptoms such as chest pain, nausea vomiting, fevers chills myalgias and or unexpected weight loss he has not had appetite changes denies any bone or extremity pain also denies any bowel symptoms and or lower urinary tract symptoms.  States he has seen part of his imaging at the prior hospital offered to review imaging with patient this evening, discussed plan for steroids, seizure medications and anticoagulation    ED treatment, Decadron 8 mg IV prior to arrival, Keppra 1 g IV since arrival      History reviewed. No pertinent past medical history.    History reviewed. No pertinent surgical history.    Review of patient's allergies indicates:  No Known Allergies    Current Facility-Administered Medications on File Prior to Encounter   Medication    [COMPLETED] dexAMETHasone injection 8 mg    [DISCONTINUED] acetaminophen tablet 650 mg    [DISCONTINUED] ondansetron injection 4 mg    [DISCONTINUED] sodium chloride 0.9% flush 10 mL    [DISCONTINUED] tuberculin injection 5 Units     No current outpatient medications on file prior to encounter.     Family History       Problem Relation (Age of Onset)    Diverticulitis Mother    Prostate cancer Brother          Tobacco Use    Smoking status: Former     Packs/day: 0.00     Types: Cigarettes    Smokeless tobacco: Never    Tobacco comments:     Rare cigarette use in his teens.   Substance and Sexual Activity    Alcohol use: Yes     Alcohol/week: 6.0 standard drinks     Types: 6 Standard drinks or equivalent per week    Drug use: Not Currently     Types: Marijuana     Comment: THC in his  teens    Sexual activity: Not on file     Review of Systems   Constitutional:  Negative for unexpected weight change.   HENT:  Negative for trouble swallowing and voice change.    Eyes: Negative.  Negative for visual disturbance.   Cardiovascular: Negative.    Gastrointestinal: Negative.    Genitourinary: Negative.    Skin:  Negative for rash.   Neurological:  Negative for dizziness, speech difficulty and numbness.   Hematological:  Negative for adenopathy. Does not bruise/bleed easily.   Psychiatric/Behavioral:  Negative for agitation and confusion.    All other systems reviewed and are negative.  Objective:     Vital Signs (Most Recent):  Temp: 99.2 °F (37.3 °C) (01/25/23 2025)  Pulse: 82 (01/26/23 0030)  Resp: 20 (01/26/23 0030)  BP: 129/72 (01/26/23 0030)  SpO2: 96 % (01/26/23 0030) Vital Signs (24h Range):  Temp:  [97.8 °F (36.6 °C)-99.2 °F (37.3 °C)] 99.2 °F (37.3 °C)  Pulse:  [81-96] 82  Resp:  [16-20] 20  SpO2:  [95 %-98 %] 96 %  BP: (120-181)/(69-95) 129/72     Weight: 90.7 kg (200 lb)  Body mass index is 31.32 kg/m².    Physical Exam  Vitals and nursing note reviewed.   Constitutional:       General: He is not in acute distress.     Appearance: He is not ill-appearing.      Comments: Some temporal wasting   HENT:      Head: Normocephalic and atraumatic.      Comments: Left frontal subcutaneous nodule     Mouth/Throat:      Pharynx: Oropharynx is clear. No oropharyngeal exudate.      Comments: Missing teeth  Eyes:      General: No scleral icterus.     Extraocular Movements: Extraocular movements intact.      Pupils: Pupils are equal, round, and reactive to light.      Comments: No nystagmus   Cardiovascular:      Rate and Rhythm: Normal rate and regular rhythm.      Pulses: Normal pulses.      Heart sounds: Murmur heard.   Pulmonary:      Effort: Pulmonary effort is normal. No respiratory distress.      Comments: Some coarse expiratory breath sounds bilaterally decreased breath sounds at the  bases  Abdominal:      General: There is no distension.      Palpations: Abdomen is soft.      Tenderness: There is no abdominal tenderness. There is no guarding.   Musculoskeletal:      Cervical back: Neck supple. No tenderness.      Right lower leg: No edema.      Left lower leg: No edema.   Lymphadenopathy:      Cervical: No cervical adenopathy.   Skin:     General: Skin is warm and dry.   Neurological:      General: No focal deficit present.      Mental Status: He is alert and oriented to person, place, and time.      Comments: No asterixis   Psychiatric:         Mood and Affect: Mood normal.         Behavior: Behavior normal.         CRANIAL NERVES     CN III, IV, VI   Pupils are equal, round, and reactive to light.     Significant Labs: All pertinent labs within the past 24 hours have been reviewed.  CBC:   Recent Labs   Lab 01/25/23  1518   WBC 8.36   HGB 13.2*   HCT 42.0        CMP:   Recent Labs   Lab 01/25/23  1518      K 4.1      CO2 24   GLU 87   BUN 18   CREATININE 1.2   CALCIUM 9.8   PROT 8.2   ALBUMIN 3.3*   BILITOT 0.7   ALKPHOS 75   AST 26   ALT 20   ANIONGAP 12     Cardiac Markers: No results for input(s): CKMB, MYOGLOBIN, BNP, TROPISTAT in the last 48 hours.  Coagulation:   Recent Labs   Lab 01/25/23  1518   INR 1.1     Lactic Acid: No results for input(s): LACTATE in the last 48 hours.  Lipase: No results for input(s): LIPASE in the last 48 hours.  Magnesium: No results for input(s): MG in the last 48 hours.  Troponin: No results for input(s): TROPONINI, TROPONINIHS in the last 48 hours.    Significant Imaging: I have reviewed all pertinent imaging results/findings within the past 24 hours.    MRI BRAIN W WO CONTRAST     CLINICAL HISTORY:  Brain metastases suspected;.     TECHNIQUE:  Multiplanar multisequence MR imaging of the brain was performed before and after the administration of 10 mL Gadavist  intravenous contrast.     COMPARISON:  CT 01/25/2023      FINDINGS:  Intracranial compartment:     No midline shift or hydrocephalus.  No extra-axial blood or fluid collections.     There are multiple bilateral metastatic foci involving the cerebrum and cerebellum.  Some are homogeneous enhancing a others are heterogeneous with ring enhancement.  The largest measures approximately 3.7 x 3.1 cm in the right temporal lobe on axial 12 of series 14..  There are lesions within the bilateral frontal lobes, bilateral temporal lobes, bilateral parietal lobes and bilateral cerebellum.     There are multiple peripheral soft tissue masses in the scalp and posterior paraspinal musculature in the upper neck with associated enhancement suggesting metastatic disease.     No acute hemorrhage is detected.  No evidence of acute major vascular infarction.     There is mild associated vasogenic edema adjacent to the lesion the right temporal lobe right parietal lobe near the apex and minimally left temporal lobe and bilateral cerebellum.     Small probable remote lacunar infarct of the centrum semiovale on the right.     Mild involutional changes and chronic microvascular ischemic changes in the periventricular white matter.     Normal vascular flow voids are preserved.     Skull/extracranial contents (limited evaluation): Bone marrow signal intensity is normal.     Impression:     1. Multiple cerebral and cerebellar masses consistent with metastatic disease.  The largest is in the right temporal lobe measuring approximately 3.7 cm.  Mild associated vasogenic edema.  Follow-up recommended.  2. Multiple small enhancing masses in the scalp and posterior paraspinal musculature in the upper neck suggesting metastatic disease.  3. Involutional changes chronic microvascular ischemic changes and small remote lacunar infarct of the right centrum semiovale.  4.  This report was flagged in Epic as abnormal.        Electronically signed by: Javier Garland  Date:                                             01/25/2023  Time:                                           23:42    CT CHEST ABDOMEN PELVIS WITH CONTRAST (XPD)     CLINICAL HISTORY:  Metastatic disease evaluation;     TECHNIQUE:  Axial images were obtained of the chest, abdomen and pelvis from the thoracic inlet through the symphysis pubis after the administration intravenous contrast.     COMPARISON:  No relevant prior imaging examinations are available for correlation.     FINDINGS:  Mediastinum, hilar and axillary regions:     There are moderately prominent lymph nodes in the mediastinum and hilar regions.  There are no pleural or pericardial effusions.     There is low-density thrombus in a segmental branch of the left lower lobe pulmonary artery as seen on axial image 45 and series 2.     There are multiple soft tissue density masses in the subcutaneous fat of the chest, the largest of which is in the right anterior chest wall and measures 2.2 cm in greatest dimension.     Lungs:     There are numerable soft tissue density masses throughout the lungs bilaterally.     Abdomen and pelvis:     There are multiple low-density masses involving the liver suggestive of metastatic disease.  A small low-density lesion is noted in the posterior aspect of the upper portion of the spleen.     The pancreas, gallbladder and adrenal glands are unremarkable.  There are multiple masses in the kidneys bilaterally the largest of which measures 3.6 cm in the left kidney.     There is low-density thrombus in the inferior vena cava near the origin of the left common iliac vein as seen on axial image 44 and series 3.     There is a large heterogeneous mass in the central portion of the pelvis measuring 12 cm in greatest dimension and displaces the urinary bladder anteriorly.  The appendix is normal.     There are multiple enlarged lymph nodes in the mesentery measuring up to 3.6 cm in greatest dimension.     Multiple masses are noted in the subcutaneous fat of the abdomen and  pelvis.  Phleboliths are noted in the pelvis.     There are masses in the left ischial rectal fossa as well as in the perirectal region.     Delayed images:     There is contrast excretion into both renal pelvocaliceal systems and contrast accumulation in the urinary bladder.     Osseous structures:     There are lytic lesions involving the right 3rd, 7th and 11th ribs and the L2, L4 and L5 vertebrae as well as the medial aspect of the left iliac bone lateral to the inferior margin of the sacroiliac joint..     An additional lytic lesion is noted involving the medial aspect of the right scapula.     Impression:     Multiple masses in the lungs and in the soft tissues of the chest, abdomen and pelvis suggestive of metastatic disease.     Large heterogeneous mass in the central portion of the pelvis measuring 12 cm suggestive of a neoplasm.     Enlarged lymph nodes in the mesentery and moderately enlarged lymph nodes in the mediastinal and hilar regions and the perirectal region as well as in the left ischiorectal fossa.     Low-density thrombus in a segmental branch of the left lower lobe pulmonary artery as well as in the inferior vena cava near the origin of the left common iliac vein.     Masses in the kidneys and liver suggestive of metastatic disease.     Multiple lytic lesions in the axial and appendicular skeleton suggestive of metastatic disease.     This report was called to the emergency department physician assistant at 17:21 on January 23, 2023.        Electronically signed by: Domingo Rodriguez MD  Date:                                            01/23/2023  Time:                                           17:24          Assessment/Plan:     * Multiple lesions of metastatic malignancy  -patient without routine medical care over the last 20yrs, uninsured, no hx of prior cancer screening  -no overt cancer risk factors.   -Metastatic disease to CNS/Lungs/abdomen/skeletal lesions  -IR consult to evaluate  for CT guided biopsy  -Oncology consult to assist with further workup despite diffuse lesions patient has good performance status, stable renal/hepatic function  -No active pain complaints - PRN duo nebs, PRN anti-emetics and pain medications.       Metastasis to brain of unknown origin  Seen by neurosurgery  -no operative intervention recommended  -no focal neurologic deficits noted   -continue Dexamethasone 4mg PO q6hrs   >famotidine stress ulcer prophylaxis  -continue Keppra 500mg PO BID as seizure prophylaxis, fall/seizure precautions       Vasogenic brain edema  As per metastasis to brain of unknown origin problem      Acute pulmonary embolism  Start on therapeutic lovenox 1mg/kg q12 at this time, transition to oral anticoagulant for discharge.       IVC thrombosis  Start on therapeutic lovenox 1mg/kg q12 at this time, transition to oral anticoagulant for discharge.       Acute deep vein thrombosis of left iliac vein  Start on therapeutic lovenox 1mg/kg q12 at this time, transition to oral anticoagulant for discharge.         VTE Risk Mitigation (From admission, onward)         Ordered     enoxaparin injection 90 mg  Every 12 hours (non-standard times)         01/26/23 0032     IP VTE HIGH RISK PATIENT  Once         01/26/23 0032     Reason for No Pharmacological VTE Prophylaxis  Once        Question:  Reasons:  Answer:  Physician Provided (leave comment)    01/26/23 0032                   Demian Edmond MD  Department of Hospital Medicine   [facility] - Emergency Dept

## 2023-01-26 NOTE — ASSESSMENT & PLAN NOTE
-patient without routine medical care over the last 20yrs, uninsured, no hx of prior cancer screening  -no overt cancer risk factors.   -Metastatic disease to CNS/Lungs/abdomen/skeletal lesions  -IR consult to evaluate for CT guided biopsy  -Oncology consult to assist with further workup despite diffuse lesions patient has good performance status, stable renal/hepatic function  -No active pain complaints - PRN duo nebs, PRN anti-emetics and pain medications.

## 2023-01-26 NOTE — CONSULTS
"Biopsy Consult Note  Interventional Radiology    Consult Requested By: Demian Edmond MD   Reason for Consult: "widely metastatic disease of unknown primary - eval for CT guided biopsy to aid diagnosis"    SUBJECTIVE:     Chief Complaint:  metastatic disease of unknown origin- biopsy request    History of Present Illness:  Daryle Lynn Howard is a 63 y.o. 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 Interventional Radiology has been consulted for image guided targeted biopsy for further workup. Pt has had recent imaging including a  CT c/a/p  on 1/24/23 which revealed "multiple masses in the lungs and in the soft tissues of the chest, abdomen and pelvis suggestive of metastatic disease.large heterogeneous mass in the central portion of the pelvis measuring 12 cm suggestive of a neoplasm.enlarged lymph nodes in the mesentery and moderately enlarged lymph nodes in the mediastinal and hilar regions and the perirectal region as well as in the left ischiorectal fossa". The pt has not undergone biopsy in the past.     Review of Systems   Constitutional:  Positive for malaise/fatigue. Negative for chills, fever and weight loss.   HENT: Negative.     Eyes: Negative.    Respiratory: Negative.     Cardiovascular: Negative.    Gastrointestinal: Negative.    Genitourinary: Negative.    Musculoskeletal: Negative.    Skin: Negative.    Neurological:  Positive for weakness (generalized). Negative for seizures and loss of consciousness.     Scheduled Meds:   dexAMETHasone  4 mg Oral Q6H    enoxaparin  1 mg/kg Subcutaneous Q12H    famotidine  20 mg Oral BID AC    levETIRAcetam  500 mg Oral BID     Continuous Infusions:  PRN Meds:acetaminophen, albuterol-ipratropium, dextrose 10%, dextrose 10%, glucagon (human recombinant), glucose, glucose, melatonin, naloxone, ondansetron, oxyCODONE, polyethylene " glycol, prochlorperazine, senna-docusate 8.6-50 mg, sodium chloride 0.9%    Review of patient's allergies indicates:  No Known Allergies    History reviewed. No pertinent past medical history.  History reviewed. No pertinent surgical history.  Family History   Problem Relation Age of Onset    Diverticulitis Mother     Prostate cancer Brother      Social History     Tobacco Use    Smoking status: Former     Packs/day: 0.00     Types: Cigarettes    Smokeless tobacco: Never    Tobacco comments:     Rare cigarette use in his teens.   Substance Use Topics    Alcohol use: Yes     Alcohol/week: 6.0 standard drinks     Types: 6 Standard drinks or equivalent per week    Drug use: Not Currently     Types: Marijuana     Comment: THC in his teens       OBJECTIVE:     Vital Signs (Most Recent)  Temp: 99.5 °F (37.5 °C) (01/26/23 1211)  Pulse: 91 (01/26/23 1211)  Resp: 16 (01/26/23 1211)  BP: 129/74 (01/26/23 1211)  SpO2: (!) 94 % (01/26/23 1211)    Physical Exam:  Physical Exam  Vitals and nursing note reviewed.   Constitutional:       General: He is not in acute distress.     Appearance: Normal appearance. He is not ill-appearing.   HENT:      Head: Normocephalic and atraumatic.   Eyes:      Extraocular Movements: Extraocular movements intact.      Conjunctiva/sclera: Conjunctivae normal.      Pupils: Pupils are equal, round, and reactive to light.   Pulmonary:      Effort: Pulmonary effort is normal. No respiratory distress.   Abdominal:      General: Abdomen is flat. There is no distension.   Musculoskeletal:         General: No swelling. Normal range of motion.   Skin:     General: Skin is warm and dry.      Coloration: Skin is not jaundiced.   Neurological:      General: No focal deficit present.      Mental Status: He is alert and oriented to person, place, and time.   Psychiatric:         Mood and Affect: Mood normal.         Behavior: Behavior normal.         Thought Content: Thought content normal.         Judgment:  Judgment normal.       Laboratory  I have reviewed all pertinent lab results within the past 24 hours.  CBC:   Recent Labs   Lab 01/26/23  0445   WBC 8.50   RBC 3.94*   HGB 11.5*   HCT 36.8*      MCV 93   MCH 29.2   MCHC 31.3*     BMP:   Recent Labs   Lab 01/26/23  0445   *      K 4.7      CO2 22*   BUN 16   CREATININE 1.0   CALCIUM 10.1   MG 1.8     CMP:   Recent Labs   Lab 01/26/23  0445   *   CALCIUM 10.1   ALBUMIN 3.2*   PROT 7.9      K 4.7   CO2 22*      BUN 16   CREATININE 1.0   ALKPHOS 80   ALT 17   AST 20   BILITOT 0.6     LFTs:   Recent Labs   Lab 01/26/23  0445   ALT 17   AST 20   ALKPHOS 80   BILITOT 0.6   PROT 7.9   ALBUMIN 3.2*     Coagulation:   Recent Labs   Lab 01/25/23  1518   LABPROT 11.4   INR 1.1     Microbiology Results (last 7 days)       ** No results found for the last 168 hours. **            Imaging:  Recent imaging studies including  CT c/a/p  on 1/25/23 which was independently reviewed by Valente Wells MD.     EXAMINATION:  CT CHEST ABDOMEN PELVIS WITH CONTRAST (XPD)     CLINICAL HISTORY:  Metastatic disease evaluation;     TECHNIQUE:  Axial images were obtained of the chest, abdomen and pelvis from the thoracic inlet through the symphysis pubis after the administration intravenous contrast.     COMPARISON:  No relevant prior imaging examinations are available for correlation.     FINDINGS:  Mediastinum, hilar and axillary regions:     There are moderately prominent lymph nodes in the mediastinum and hilar regions.  There are no pleural or pericardial effusions.     There is low-density thrombus in a segmental branch of the left lower lobe pulmonary artery as seen on axial image 45 and series 2.     There are multiple soft tissue density masses in the subcutaneous fat of the chest, the largest of which is in the right anterior chest wall and measures 2.2 cm in greatest dimension.     Lungs:     There are numerable soft tissue density masses  throughout the lungs bilaterally.     Abdomen and pelvis:     There are multiple low-density masses involving the liver suggestive of metastatic disease.  A small low-density lesion is noted in the posterior aspect of the upper portion of the spleen.     The pancreas, gallbladder and adrenal glands are unremarkable.  There are multiple masses in the kidneys bilaterally the largest of which measures 3.6 cm in the left kidney.     There is low-density thrombus in the inferior vena cava near the origin of the left common iliac vein as seen on axial image 44 and series 3.     There is a large heterogeneous mass in the central portion of the pelvis measuring 12 cm in greatest dimension and displaces the urinary bladder anteriorly.  The appendix is normal.     There are multiple enlarged lymph nodes in the mesentery measuring up to 3.6 cm in greatest dimension.     Multiple masses are noted in the subcutaneous fat of the abdomen and pelvis.  Phleboliths are noted in the pelvis.     There are masses in the left ischial rectal fossa as well as in the perirectal region.     Delayed images:     There is contrast excretion into both renal pelvocaliceal systems and contrast accumulation in the urinary bladder.     Osseous structures:     There are lytic lesions involving the right 3rd, 7th and 11th ribs and the L2, L4 and L5 vertebrae as well as the medial aspect of the left iliac bone lateral to the inferior margin of the sacroiliac joint..     An additional lytic lesion is noted involving the medial aspect of the right scapula.     Impression:     Multiple masses in the lungs and in the soft tissues of the chest, abdomen and pelvis suggestive of metastatic disease.     Large heterogeneous mass in the central portion of the pelvis measuring 12 cm suggestive of a neoplasm.     Enlarged lymph nodes in the mesentery and moderately enlarged lymph nodes in the mediastinal and hilar regions and the perirectal region as well as in  the left ischiorectal fossa.     Low-density thrombus in a segmental branch of the left lower lobe pulmonary artery as well as in the inferior vena cava near the origin of the left common iliac vein.     Masses in the kidneys and liver suggestive of metastatic disease.     Multiple lytic lesions in the axial and appendicular skeleton suggestive of metastatic disease.     This report was called to the emergency department physician assistant at 17:21 on January 23, 2023.        Electronically signed by: Domingo Rodriguez MD  Date:                                            01/23/2023  Time:                                           17:24    ASSESSMENT/PLAN:     Assessment:  63 y.o. male with no known PMHx who has been referred to IR for image guided targeted biopsy for workup of metastatic disease of unknown primary. The procedure was discussed in great detail with the patient including thorough explanations of the potential risks and benefits of image guided targeted  mass  biopsy. Risks include sepsis, severe infection, hemorrhage, and damage to surrounding structures. Unfortunately the IR department does not have the bandwidth to offer inpatient targeted mass biopsies for cancer workup, but the patient is a candidate for image guided targeted  mass  biopsy under moderate sedation as an outpatient. This was explained to both the ordering team and the pt and his family. Plan discussed with ordering physician.The pt verbalized understanding of the plan and would like to proceed.    Plan:  Pt is not a candidate for inpatient targeted mass biopsy  If outpatient biopsy is still needed by the time the pt is ready for discharge, please contact the IR department so we can get him scheduled for an outpatient biopsy.  Thank you for the consult. IR will sign off. Please contact with questions via [employer] secure chat     Juanis Sauer PA-C  Interventional Radiology   Spectra: [id number]

## 2023-01-26 NOTE — ED NOTES
Patient identifiers verified and correct for Daryle Howard  LOC: The patient is awake, alert and aware of environment with an appropriate affect, the patient is oriented x 3 and speaking appropriately.   APPEARANCE: Patient appears comfortable and in no acute distress, patient is clean and well groomed.  SKIN: The skin is warm and dry, color consistent with ethnicity, patient has normal skin turgor and moist mucus membranes, skin intact, no breakdown or bruising noted.   MUSCULOSKELETAL: Patient moving all extremities spontaneously, no swelling noted.  RESPIRATORY: Airway is open and patent, respirations are spontaneous, patient has a normal effort and rate, no accessory muscle use noted, pt placed on continuous pulse ox with O2 sats noted at 95% on room air.  CARDIAC: Patient has a normal rate 96 and regular rhythm, no edema noted, capillary refill < 3 seconds.   GASTRO: Soft and non tender to palpation, no distention noted, normoactive bowel sounds present in all four quadrants. Pt states bowel movements have been regular.  : Pt denies any pain or frequency with urination.  NEURO: Pt opens eyes spontaneously, behavior appropriate to situation, follows commands, facial expression symmetrical, bilateral hand grasp equal and even, purposeful motor response noted, normal sensation in all extremities when touched with a finger.

## 2023-01-26 NOTE — ASSESSMENT & PLAN NOTE
Start on therapeutic lovenox 1mg/kg q12 at this time, transition to oral anticoagulant for discharge.
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
## 2023-01-26 NOTE — HPI
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     He was initially seen January 23rd in the ED department at [facility], presenting with complaints of 1 month of fatigue, noted to have rales on pulmonary exam initial chest x-ray showed innumerable pulmonary masses and he had a subsequent CT chest abdomen pelvis that has revealed numerous pulmonary lesions, as well as abdominal lesions, lytic bone lesions and a large pelvic mass all concerning for signs of a metastatic malignancy, and 3 areas of venous thromboembolism.  He had no prior surgeries, no prior history is of a malignancy diagnosis, he lives alone and works at [employer].  No history of tob,acco use or smoking, drinking approximately 6 alcoholic drinks per week and denies any history of drug use, no prior  service no report of chemical exposures and or asbestos exposures in his working career.    He was plan for admission but ultimately left against medical advise for personal reasons and then re-presented to the emergency department there in home today and a CT head was completed which showed brain lesions with associated vasogenic edema and a and a transfer was requested and Neurosurgery recommended the ED to ED transfer.    On discussion with patient besides fatigue and occasional cough and some shortness of breath with exertion that is been occurring since Christmas he denies any other symptoms such as chest pain, nausea vomiting, fevers chills myalgias and or unexpected weight loss he has not had appetite changes denies any bone or extremity pain also denies any bowel symptoms and or lower urinary tract symptoms.  States he has seen part of his imaging at the prior hospital offered to review imaging with patient this evening, discussed plan for  steroids, seizure medications and anticoagulation

## 2023-01-26 NOTE — CONSULTS
[facility] Dept  Neurosurgery  Consult Note    Inpatient consult to Neurosurgery  Consult performed by: Harpal Stafford MD  Consult ordered by: YONG Sabillon        Subjective:     Chief Complaint/Reason for Admission: fatigue    History of Present Illness: Pt is a 63M no past medical history presents with chronic fatigue and weight loss to outside hospital. Workup showed significant metastatic tumor burden and CTH showed multiple brain metastases. Pt is neurologically intact, [place] [facility] [place].      (Not in a hospital admission)      Review of patient's allergies indicates:  No Known Allergies    No past medical history on file.  No past surgical history on file.  Family History    None       Tobacco Use    Smoking status: Former     Packs/day: 0.00     Types: Cigarettes    Smokeless tobacco: Never    Tobacco comments:     Rare cigarette use in his teens.   Substance and Sexual Activity    Alcohol use: Yes     Alcohol/week: 6.0 standard drinks     Types: 6 Standard drinks or equivalent per week    Drug use: Not Currently     Types: Marijuana     Comment: THC in his teens    Sexual activity: Not on file     Review of Systems   Constitutional:  Positive for fatigue and unexpected weight change. Negative for activity change.   HENT:  Negative for hearing loss and trouble swallowing.    Eyes:  Negative for photophobia and visual disturbance.   Respiratory:  Negative for shortness of breath.    Cardiovascular:  Negative for chest pain.   Gastrointestinal:  Positive for abdominal distention. Negative for nausea and vomiting.   Genitourinary:  Negative for difficulty urinating.   Musculoskeletal:  Negative for back pain, neck pain and neck stiffness.   Neurological:  Positive for dizziness. Negative for tremors, seizures, weakness, numbness and headaches.   Psychiatric/Behavioral:  Negative for confusion.    Objective:        There is no height or weight on  file to calculate BMI.  Vital Signs (Most Recent):  Temp: 99.2 °F (37.3 °C) (01/25/23 2025)  Pulse: 90 (01/25/23 2131)  Resp: 20 (01/25/23 2025)  BP: 138/76 (01/25/23 2131)  SpO2: 95 % (01/25/23 2132) Vital Signs (24h Range):  Temp:  [97.8 °F (36.6 °C)-99.2 °F (37.3 °C)] 99.2 °F (37.3 °C)  Pulse:  [86-96] 90  Resp:  [16-20] 20  SpO2:  [95 %-98 %] 95 %  BP: (136-181)/(76-95) 138/76                          Physical Exam    Neurosurgery Physical Exam  General: well developed, well nourished, no distress.   Head: normocephalic, atraumatic  CV: RRR, pulses equal bilateral  Pulmonary: normal respirations, no signs of respiratory distress  Abdomen: soft, non-distended  Skin: Skin is warm, dry and intact.    Neuro:  E4V5M6  Awake, alert, Ox4  PERRL, EOMI  CNII-XII grossly intact  Motor: Follows commands full strength x4  SILT    Significant Labs:  Recent Labs   Lab 01/25/23  1518   GLU 87      K 4.1      CO2 24   BUN 18   CREATININE 1.2   CALCIUM 9.8     Recent Labs   Lab 01/25/23  1518   WBC 8.36   HGB 13.2*   HCT 42.0        Recent Labs   Lab 01/25/23  1518   INR 1.1     Microbiology Results (last 7 days)       ** No results found for the last 168 hours. **          All pertinent labs from the last 24 hours have been reviewed.    Significant Diagnostics:  I have reviewed all pertinent imaging results/findings within the past 24 hours.  I have reviewed and interpreted all pertinent imaging results/findings within the past 24 hours.    Assessment/Plan:     Metastasis of unknown primary  Pt is a 63M no past medical history presents with chronic fatigue and weight loss to outside hospital. Workup showed significant metastatic tumor burden and CTH showed multiple brain metastases. Pt is neurologically intact, transferred to [facility] [place] for further workup and evaluation.    Plan:  Admit to  service, Q4h neurochecks  FU MRI brain w w/o contrast for metastatic workup  SBP<160, HOB>30  Dex load, followed by  4q6  Keppra load 1g, followed by 500 BID  Recommend oncology consult and IR consult for biopsy  Further recs to follow imaging        Thank you for your consult. I will follow-up with patient. Please contact us if you have any additional questions.    Harpal Stafford MD  Neurosurgery  [facility]

## 2023-01-26 NOTE — HOSPITAL COURSE
1/26: KARLA. AFNVSS. Denies hx of seizures, headache, pain, changes in vision or speech. Endorses only feeling of fatigue, but not shortness of breath or trouble breathing. PEERLA. ROM and strength equal and unremarkable. No numbness/tingling. Endorses understanding of tumor burden and next steps to get a proper diagnosis.

## 2023-01-26 NOTE — SUBJECTIVE & OBJECTIVE
Oncology Treatment Plan:   [No matching plan found]    Medications:  Continuous Infusions:  Scheduled Meds:   dexAMETHasone  4 mg Oral Q6H    enoxaparin  1 mg/kg Subcutaneous Q12H    famotidine  20 mg Oral BID AC    levETIRAcetam  500 mg Oral BID     PRN Meds:acetaminophen, albuterol-ipratropium, dextrose 10%, dextrose 10%, glucagon (human recombinant), glucose, glucose, melatonin, naloxone, ondansetron, oxyCODONE, polyethylene glycol, prochlorperazine, senna-docusate 8.6-50 mg, sodium chloride 0.9%     Review of patient's allergies indicates:  No Known Allergies     History reviewed. No pertinent past medical history.  History reviewed. No pertinent surgical history.  Family History       Problem Relation (Age of Onset)    Diverticulitis Mother    Prostate cancer Brother          Tobacco Use    Smoking status: Former     Packs/day: 0.00     Types: Cigarettes    Smokeless tobacco: Never    Tobacco comments:     Rare cigarette use in his teens.   Substance and Sexual Activity    Alcohol use: Yes     Alcohol/week: 6.0 standard drinks     Types: 6 Standard drinks or equivalent per week    Drug use: Not Currently     Types: Marijuana     Comment: THC in his teens    Sexual activity: Not on file       Review of Systems   Constitutional:  Positive for activity change, appetite change and unexpected weight change.   Respiratory:  Positive for cough.    Neurological:  Positive for headaches.   Objective:     Vital Signs (Most Recent):  Temp: 99.5 °F (37.5 °C) (01/26/23 1211)  Pulse: 91 (01/26/23 1211)  Resp: 16 (01/26/23 1211)  BP: 129/74 (01/26/23 1211)  SpO2: (!) 94 % (01/26/23 1211)   Vital Signs (24h Range):  Temp:  [97.8 °F (36.6 °C)-99.5 °F (37.5 °C)] 99.5 °F (37.5 °C)  Pulse:  [77-96] 91  Resp:  [16-20] 16  SpO2:  [94 %-99 %] 94 %  BP: (120-181)/(69-95) 129/74     Weight: 90.7 kg (200 lb)  Body mass index is 31.32 kg/m².  Body surface area is 2.07 meters squared.    No intake or output data in the 24 hours ending  01/26/23 1353    Physical Exam  Vitals and nursing note reviewed.   Constitutional:       Appearance: He is ill-appearing.      Comments: Cachetic.    Cardiovascular:      Rate and Rhythm: Normal rate and regular rhythm.      Pulses: Normal pulses.      Heart sounds: Normal heart sounds.   Pulmonary:      Effort: Pulmonary effort is normal.      Breath sounds: Normal breath sounds.   Abdominal:      General: Abdomen is flat.      Palpations: Abdomen is soft.   Neurological:      General: No focal deficit present.      Mental Status: He is alert and oriented to person, place, and time.   Psychiatric:      Comments: Anxious regarding medical status.         Significant Labs:   All pertinent labs from the last 24 hours have been reviewed.    Diagnostic Results:  I have reviewed all pertinent imaging results/findings within the past 24 hours.

## 2023-01-26 NOTE — SUBJECTIVE & OBJECTIVE
(Not in a hospital admission)      Review of patient's allergies indicates:  No Known Allergies    No past medical history on file.  No past surgical history on file.  Family History    None       Tobacco Use    Smoking status: Former     Packs/day: 0.00     Types: Cigarettes    Smokeless tobacco: Never    Tobacco comments:     Rare cigarette use in his teens.   Substance and Sexual Activity    Alcohol use: Yes     Alcohol/week: 6.0 standard drinks     Types: 6 Standard drinks or equivalent per week    Drug use: Not Currently     Types: Marijuana     Comment: THC in his teens    Sexual activity: Not on file     Review of Systems   Constitutional:  Positive for fatigue and unexpected weight change. Negative for activity change.   HENT:  Negative for hearing loss and trouble swallowing.    Eyes:  Negative for photophobia and visual disturbance.   Respiratory:  Negative for shortness of breath.    Cardiovascular:  Negative for chest pain.   Gastrointestinal:  Positive for abdominal distention. Negative for nausea and vomiting.   Genitourinary:  Negative for difficulty urinating.   Musculoskeletal:  Negative for back pain, neck pain and neck stiffness.   Neurological:  Positive for dizziness. Negative for tremors, seizures, weakness, numbness and headaches.   Psychiatric/Behavioral:  Negative for confusion.    Objective:        There is no height or weight on file to calculate BMI.  Vital Signs (Most Recent):  Temp: 99.2 °F (37.3 °C) (01/25/23 2025)  Pulse: 90 (01/25/23 2131)  Resp: 20 (01/25/23 2025)  BP: 138/76 (01/25/23 2131)  SpO2: 95 % (01/25/23 2132) Vital Signs (24h Range):  Temp:  [97.8 °F (36.6 °C)-99.2 °F (37.3 °C)] 99.2 °F (37.3 °C)  Pulse:  [86-96] 90  Resp:  [16-20] 20  SpO2:  [95 %-98 %] 95 %  BP: (136-181)/(76-95) 138/76                          Physical Exam    Neurosurgery Physical Exam  General: well developed, well nourished, no distress.   Head: normocephalic, atraumatic  CV: RRR, pulses equal  bilateral  Pulmonary: normal respirations, no signs of respiratory distress  Abdomen: soft, non-distended  Skin: Skin is warm, dry and intact.    Neuro:  E4V5M6  Awake, alert, Ox4  PERRL, EOMI  CNII-XII grossly intact  Motor: Follows commands full strength x4  SILT    Significant Labs:  Recent Labs   Lab 01/25/23  1518   GLU 87      K 4.1      CO2 24   BUN 18   CREATININE 1.2   CALCIUM 9.8     Recent Labs   Lab 01/25/23  1518   WBC 8.36   HGB 13.2*   HCT 42.0        Recent Labs   Lab 01/25/23  1518   INR 1.1     Microbiology Results (last 7 days)       ** No results found for the last 168 hours. **          All pertinent labs from the last 24 hours have been reviewed.    Significant Diagnostics:  I have reviewed all pertinent imaging results/findings within the past 24 hours.  I have reviewed and interpreted all pertinent imaging results/findings within the past 24 hours.

## 2023-01-26 NOTE — ASSESSMENT & PLAN NOTE
"Pt is a 63M no past medical history presents with chronic fatigue and weight loss to outside hospital. Workup showed significant metastatic tumor burden and CTH showed multiple brain metastases. Pt is neurologically intact, transferred to [facility] [place] for further workup and evaluation.    Admitted to  service 1/25    MRI brain w w/o contrast reviewed, showed "Multiple cerebral and cerebellar masses consistent with metastatic disease.  The largest is in the right temporal lobe measuring approximately 3.7 cm.  Mild associated vasogenic edema. Multiple small enhancing masses in the scalp and posterior paraspinal musculature in the upper neck suggesting metastatic disease. Involutional changes chronic microvascular ischemic changes and small remote lacunar infarct of the right centrum semiovale".     Plan:  Q4h neurochecks  SBP<160, HOB>30  Loaded with Dex, continue 4q6  Loaded with Keppra, continue 500 BID  Given number of brain lesions, likely recommend whole brain radiation treatment. Surgery not recommended at this time. Recommend Rad/Onc consult for possible radiation treatment.  Recommend oncology consult and preferred route for obtaining biopsy  Recommend IR consult for biopsy as well  "
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
## 2023-01-26 NOTE — CONSULTS
"[facility] - [facility] Dept  Hematology/Oncology  Consult Note    Patient Name: Daryle Lynn Howard  MRN: [id number]  Admission Date: 1/25/2023  Hospital Length of Stay: 1 days  Code Status: Full Code   Attending Provider: Ba Rainey*  Consulting Provider: Christian Raphael DO  Primary Care Physician: Primary Doctor No  Principal Problem:Multiple lesions of metastatic malignancy    Inpatient consult to Hematology/Oncology  Consult performed by: Christian Raphael DO  Consult ordered by: Demian Edmond MD        Subjective:     HPI:  Patient is a 63 year-old male with no known PMH who presents to [facility] as a transfer following new findings at OSH concerning for metastatic CNS Disease requiring a higher level of care. The patient originally presented to [facility] on 1/23/23 for a clinical presentation of faigue, progressive weakness, cough, and overall change in performance status. Further evaluation with CXR was concerning for "Innumerable nodular opacities throughout both lungs very concerning for metastatic disease to lungs." Furhter work-up was obtained with CT C/A/P which was consistent with malignancy of unknown primary with "Multiple masses in the lungs and in the soft tissues of the chest, abdomen and pelvis suggestive of metastatic disease, Large heterogeneous mass in the central portion of the pelvis measuring 12 cm suggestive of a neoplasm, Enlarged lymph nodes in the mesentery and moderately enlarged lymph nodes in the mediastinal and hilar regions and the perirectal region as well as in the left ischiorectal fossa, Multiple lytic lesions in the axial and appendicular skeleton suggestive of metastatic disease." The patient ended up leaving [facility] after receiving these results and presented back on 1/25/23 due to worsening fatigue and at the suggestion of his family. Due to reports of worsening VARGAS, CT Brain was obtained showing "Multiple subtle ring and nodular lesions with mild " "associated vasogenic edema involving the cerebrum bilaterally and cerebellum as above presumed metastatic disease to brain as seen on this noncontrast CT." The patient was transferred to [facility] and evaluated by Neurosurgery in the ED with MRI Brain ordered, and recs for Keppra and Dexamethasone for seizure PPX and edema management. MRI brain resulting with "Multiple cerebral and cerebellar masses consistent with metastatic disease.  The largest is in the right temporal lobe measuring approximately 3.7 cm.  Mild associated vasogenic edema." Oncology consulted for assistance with work-up for Malignancy of unknown primary. At the bedside, patient is vitally stable and in no acute distress. He denies any previous history of malignancy, known toxic exposures, tobacco use, or substantial ETOH use. He was updated regarding the findings above and the overall clinical picture being malignancy of unknown primary and the incurable nature of this and was agreeable and understanding. We discussed options for identifying his malignancy in order to evaluate for any treatment options and was agreeable.       Oncology Treatment Plan:   [No matching plan found]    Medications:  Continuous Infusions:  Scheduled Meds:   dexAMETHasone  4 mg Oral Q6H    enoxaparin  1 mg/kg Subcutaneous Q12H    famotidine  20 mg Oral BID AC    levETIRAcetam  500 mg Oral BID     PRN Meds:acetaminophen, albuterol-ipratropium, dextrose 10%, dextrose 10%, glucagon (human recombinant), glucose, glucose, melatonin, naloxone, ondansetron, oxyCODONE, polyethylene glycol, prochlorperazine, senna-docusate 8.6-50 mg, sodium chloride 0.9%     Review of patient's allergies indicates:  No Known Allergies     History reviewed. No pertinent past medical history.  History reviewed. No pertinent surgical history.  Family History       Problem Relation (Age of Onset)    Diverticulitis Mother    Prostate cancer Brother          Tobacco Use    Smoking status: Former     " Packs/day: 0.00     Types: Cigarettes    Smokeless tobacco: Never    Tobacco comments:     Rare cigarette use in his teens.   Substance and Sexual Activity    Alcohol use: Yes     Alcohol/week: 6.0 standard drinks     Types: 6 Standard drinks or equivalent per week    Drug use: Not Currently     Types: Marijuana     Comment: THC in his teens    Sexual activity: Not on file       Review of Systems   Constitutional:  Positive for activity change, appetite change and unexpected weight change.   Respiratory:  Positive for cough.    Neurological:  Positive for headaches.   Objective:     Vital Signs (Most Recent):  Temp: 99.5 °F (37.5 °C) (01/26/23 1211)  Pulse: 91 (01/26/23 1211)  Resp: 16 (01/26/23 1211)  BP: 129/74 (01/26/23 1211)  SpO2: (!) 94 % (01/26/23 1211)   Vital Signs (24h Range):  Temp:  [97.8 °F (36.6 °C)-99.5 °F (37.5 °C)] 99.5 °F (37.5 °C)  Pulse:  [77-96] 91  Resp:  [16-20] 16  SpO2:  [94 %-99 %] 94 %  BP: (120-181)/(69-95) 129/74     Weight: 90.7 kg (200 lb)  Body mass index is 31.32 kg/m².  Body surface area is 2.07 meters squared.    No intake or output data in the 24 hours ending 01/26/23 1353    Physical Exam  Vitals and nursing note reviewed.   Constitutional:       Appearance: He is ill-appearing.      Comments: Cachetic.    Cardiovascular:      Rate and Rhythm: Normal rate and regular rhythm.      Pulses: Normal pulses.      Heart sounds: Normal heart sounds.   Pulmonary:      Effort: Pulmonary effort is normal.      Breath sounds: Normal breath sounds.   Abdominal:      General: Abdomen is flat.      Palpations: Abdomen is soft.   Neurological:      General: No focal deficit present.      Mental Status: He is alert and oriented to person, place, and time.   Psychiatric:      Comments: Anxious regarding medical status.         Significant Labs:   All pertinent labs from the last 24 hours have been reviewed.    Diagnostic Results:  I have reviewed all pertinent imaging results/findings within  the past 24 hours.    Assessment/Plan:     Metastasis of unknown primary  Presented as noted above on 1/23/ & 1/25 with findings of metastatic disease of unknown primary  Transferred for higher level of care in setting of CNS metastatic disease  No previous history of malignancy per patient history  Broad differential at this time extent of metastatic disease and various locations  Recommendations:  -Follow-up neurosurgery recs, no plans for surgery at this time  -Follow-up Rad/Onc recs for WBRT evaluation  -Patient with diffuse pulmonary disease, lytic lesions, as well as 12 cm pelvic mass that could be amendable to biopsy, IR consulted. Would ideally have prior to DC to expedite care  -CEA, CA-19-9, PSA, AFP, B-HCG ordered for initial evaluation  -Oncology will continue to follow pending pathology and can assist with discharge follow-up once primary identified  -Patient updated regarding overall picture of metastatic disease of unknown primary and the incurable nature of this and was understanding, wishes to proceed with further evaluation  -Symptomatic management per primary team            Thank you for your consult. I will follow-up with patient. Please contact us if you have any additional questions.    Christian Raphael,   Hematology/Oncology  [facility]

## 2023-01-26 NOTE — ASSESSMENT & PLAN NOTE
Pt is a 63M no past medical history presents with chronic fatigue and weight loss to outside hospital. Workup showed significant metastatic tumor burden and CTH showed multiple brain metastases. Pt is neurologically intact, transferred to [facility] [place] [facility] for further workup and evaluation.    Plan:  Admit to HM service, Q4h neurochecks  FU MRI brain w w/o contrast for metastatic workup  SBP<160, HOB>30  Dex load, followed by 4q6  Keppra load 1g, followed by 500 BID  Recommend oncology consult and IR consult for biopsy  Further recs to follow imaging

## 2023-01-27 PROBLEM — R91.8 PULMONARY NODULES/LESIONS, MULTIPLE: Status: ACTIVE | Noted: 2023-01-27

## 2023-01-27 PROBLEM — R22.9 SUBCUTANEOUS NODULES: Status: ACTIVE | Noted: 2023-01-27

## 2023-01-27 LAB
AFP SERPL-MCNC: <2 NG/ML (ref 0–8.4)
ALBUMIN SERPL BCP-MCNC: 3.2 G/DL (ref 3.5–5.2)
ALP SERPL-CCNC: 80 U/L (ref 55–135)
ALT SERPL W/O P-5'-P-CCNC: 19 U/L (ref 10–44)
ANION GAP SERPL CALC-SCNC: 11 MMOL/L (ref 8–16)
AST SERPL-CCNC: 20 U/L (ref 10–40)
BASOPHILS # BLD AUTO: 0.01 K/UL (ref 0–0.2)
BASOPHILS # BLD AUTO: 0.02 K/UL (ref 0–0.2)
BASOPHILS NFR BLD: 0.1 % (ref 0–1.9)
BASOPHILS NFR BLD: 0.2 % (ref 0–1.9)
BILIRUB SERPL-MCNC: 0.4 MG/DL (ref 0.1–1)
BUN SERPL-MCNC: 21 MG/DL (ref 8–23)
CALCIUM SERPL-MCNC: 9.8 MG/DL (ref 8.7–10.5)
CHLORIDE SERPL-SCNC: 105 MMOL/L (ref 95–110)
CO2 SERPL-SCNC: 25 MMOL/L (ref 23–29)
CREAT SERPL-MCNC: 1.4 MG/DL (ref 0.5–1.4)
DIFFERENTIAL METHOD: ABNORMAL
DIFFERENTIAL METHOD: ABNORMAL
EOSINOPHIL # BLD AUTO: 0 K/UL (ref 0–0.5)
EOSINOPHIL # BLD AUTO: 0 K/UL (ref 0–0.5)
EOSINOPHIL NFR BLD: 0 % (ref 0–8)
EOSINOPHIL NFR BLD: 0 % (ref 0–8)
ERYTHROCYTE [DISTWIDTH] IN BLOOD BY AUTOMATED COUNT: 14.4 % (ref 11.5–14.5)
ERYTHROCYTE [DISTWIDTH] IN BLOOD BY AUTOMATED COUNT: 14.6 % (ref 11.5–14.5)
EST. GFR  (NO RACE VARIABLE): 56.5 ML/MIN/1.73 M^2
GLUCOSE SERPL-MCNC: 147 MG/DL (ref 70–110)
HCT VFR BLD AUTO: 36.9 % (ref 40–54)
HCT VFR BLD AUTO: 38 % (ref 40–54)
HGB BLD-MCNC: 11.6 G/DL (ref 14–18)
HGB BLD-MCNC: 11.9 G/DL (ref 14–18)
IMM GRANULOCYTES # BLD AUTO: 0.04 K/UL (ref 0–0.04)
IMM GRANULOCYTES # BLD AUTO: 0.05 K/UL (ref 0–0.04)
IMM GRANULOCYTES NFR BLD AUTO: 0.4 % (ref 0–0.5)
IMM GRANULOCYTES NFR BLD AUTO: 0.5 % (ref 0–0.5)
LYMPHOCYTES # BLD AUTO: 0.7 K/UL (ref 1–4.8)
LYMPHOCYTES # BLD AUTO: 0.9 K/UL (ref 1–4.8)
LYMPHOCYTES NFR BLD: 6.9 % (ref 18–48)
LYMPHOCYTES NFR BLD: 9.4 % (ref 18–48)
MAGNESIUM SERPL-MCNC: 1.9 MG/DL (ref 1.6–2.6)
MCH RBC QN AUTO: 29.5 PG (ref 27–31)
MCH RBC QN AUTO: 29.6 PG (ref 27–31)
MCHC RBC AUTO-ENTMCNC: 31.3 G/DL (ref 32–36)
MCHC RBC AUTO-ENTMCNC: 31.4 G/DL (ref 32–36)
MCV RBC AUTO: 94 FL (ref 82–98)
MCV RBC AUTO: 94 FL (ref 82–98)
MONOCYTES # BLD AUTO: 0.8 K/UL (ref 0.3–1)
MONOCYTES # BLD AUTO: 1.1 K/UL (ref 0.3–1)
MONOCYTES NFR BLD: 11.4 % (ref 4–15)
MONOCYTES NFR BLD: 8.8 % (ref 4–15)
NEUTROPHILS # BLD AUTO: 7.3 K/UL (ref 1.8–7.7)
NEUTROPHILS # BLD AUTO: 7.9 K/UL (ref 1.8–7.7)
NEUTROPHILS NFR BLD: 78.6 % (ref 38–73)
NEUTROPHILS NFR BLD: 83.7 % (ref 38–73)
NRBC BLD-RTO: 0 /100 WBC
NRBC BLD-RTO: 0 /100 WBC
PHOSPHATE SERPL-MCNC: 3 MG/DL (ref 2.7–4.5)
PLATELET # BLD AUTO: 319 K/UL (ref 150–450)
PLATELET # BLD AUTO: 344 K/UL (ref 150–450)
PMV BLD AUTO: 10.1 FL (ref 9.2–12.9)
PMV BLD AUTO: 10.4 FL (ref 9.2–12.9)
POTASSIUM SERPL-SCNC: 4.9 MMOL/L (ref 3.5–5.1)
PROT SERPL-MCNC: 8 G/DL (ref 6–8.4)
RBC # BLD AUTO: 3.92 M/UL (ref 4.6–6.2)
RBC # BLD AUTO: 4.04 M/UL (ref 4.6–6.2)
SODIUM SERPL-SCNC: 141 MMOL/L (ref 136–145)
WBC # BLD AUTO: 9.23 K/UL (ref 3.9–12.7)
WBC # BLD AUTO: 9.47 K/UL (ref 3.9–12.7)

## 2023-01-27 PROCEDURE — 83735 ASSAY OF MAGNESIUM: CPT | Performed by: HOSPITALIST

## 2023-01-27 PROCEDURE — 99222 PR INITIAL HOSPITAL CARE,LEVL II: ICD-10-PCS | Mod: ,,, | Performed by: STUDENT IN AN ORGANIZED HEALTH CARE EDUCATION/TRAINING PROGRAM

## 2023-01-27 PROCEDURE — 88305 TISSUE EXAM BY PATHOLOGIST: CPT | Performed by: DERMATOLOGY

## 2023-01-27 PROCEDURE — 99232 SBSQ HOSP IP/OBS MODERATE 35: CPT | Mod: ,,, | Performed by: STUDENT IN AN ORGANIZED HEALTH CARE EDUCATION/TRAINING PROGRAM

## 2023-01-27 PROCEDURE — 97530 THERAPEUTIC ACTIVITIES: CPT

## 2023-01-27 PROCEDURE — 88341 IMHCHEM/IMCYTCHM EA ADD ANTB: CPT | Mod: 59 | Performed by: DERMATOLOGY

## 2023-01-27 PROCEDURE — 99233 SBSQ HOSP IP/OBS HIGH 50: CPT | Mod: ,,, | Performed by: INTERNAL MEDICINE

## 2023-01-27 PROCEDURE — 80053 COMPREHEN METABOLIC PANEL: CPT | Performed by: HOSPITALIST

## 2023-01-27 PROCEDURE — 25000003 PHARM REV CODE 250: Performed by: HOSPITALIST

## 2023-01-27 PROCEDURE — 88341 IMHCHEM/IMCYTCHM EA ADD ANTB: CPT | Mod: 26,,, | Performed by: DERMATOLOGY

## 2023-01-27 PROCEDURE — 88342 CHG IMMUNOCYTOCHEMISTRY: ICD-10-PCS | Mod: 26,,, | Performed by: DERMATOLOGY

## 2023-01-27 PROCEDURE — 84100 ASSAY OF PHOSPHORUS: CPT | Performed by: HOSPITALIST

## 2023-01-27 PROCEDURE — 88341 PR IHC OR ICC EACH ADD'L SINGLE ANTIBODY  STAINPR: ICD-10-PCS | Mod: 26,,, | Performed by: DERMATOLOGY

## 2023-01-27 PROCEDURE — 88342 IMHCHEM/IMCYTCHM 1ST ANTB: CPT | Performed by: DERMATOLOGY

## 2023-01-27 PROCEDURE — 36415 COLL VENOUS BLD VENIPUNCTURE: CPT | Performed by: HOSPITALIST

## 2023-01-27 PROCEDURE — 88305 TISSUE EXAM BY PATHOLOGIST: CPT | Mod: 26,,, | Performed by: DERMATOLOGY

## 2023-01-27 PROCEDURE — 11000001 HC ACUTE MED/SURG PRIVATE ROOM

## 2023-01-27 PROCEDURE — 85025 COMPLETE CBC W/AUTO DIFF WBC: CPT | Performed by: HOSPITALIST

## 2023-01-27 PROCEDURE — 97165 OT EVAL LOW COMPLEX 30 MIN: CPT

## 2023-01-27 PROCEDURE — 99233 PR SUBSEQUENT HOSPITAL CARE,LEVL III: ICD-10-PCS | Mod: ,,, | Performed by: INTERNAL MEDICINE

## 2023-01-27 PROCEDURE — 97161 PT EVAL LOW COMPLEX 20 MIN: CPT

## 2023-01-27 PROCEDURE — 99222 1ST HOSP IP/OBS MODERATE 55: CPT | Mod: ,,, | Performed by: STUDENT IN AN ORGANIZED HEALTH CARE EDUCATION/TRAINING PROGRAM

## 2023-01-27 PROCEDURE — 97116 GAIT TRAINING THERAPY: CPT

## 2023-01-27 PROCEDURE — 88342 IMHCHEM/IMCYTCHM 1ST ANTB: CPT | Mod: 26,,, | Performed by: DERMATOLOGY

## 2023-01-27 PROCEDURE — 88305 TISSUE EXAM BY PATHOLOGIST: ICD-10-PCS | Mod: 26,,, | Performed by: DERMATOLOGY

## 2023-01-27 PROCEDURE — 99232 PR SUBSEQUENT HOSPITAL CARE,LEVL II: ICD-10-PCS | Mod: ,,, | Performed by: STUDENT IN AN ORGANIZED HEALTH CARE EDUCATION/TRAINING PROGRAM

## 2023-01-27 PROCEDURE — 63600175 PHARM REV CODE 636 W HCPCS: Performed by: HOSPITALIST

## 2023-01-27 RX ADMIN — ENOXAPARIN SODIUM 90 MG: 100 INJECTION SUBCUTANEOUS at 12:01

## 2023-01-27 RX ADMIN — LEVETIRACETAM 500 MG: 500 TABLET, FILM COATED ORAL at 08:01

## 2023-01-27 RX ADMIN — DEXAMETHASONE 4 MG: 4 TABLET ORAL at 05:01

## 2023-01-27 RX ADMIN — FAMOTIDINE 20 MG: 20 TABLET ORAL at 05:01

## 2023-01-27 RX ADMIN — DEXAMETHASONE 4 MG: 4 TABLET ORAL at 12:01

## 2023-01-27 RX ADMIN — FAMOTIDINE 20 MG: 20 TABLET ORAL at 04:01

## 2023-01-27 NOTE — PLAN OF CARE
Problem: Physical Therapy  Goal: Physical Therapy Goal  Outcome: Met   PT D/Rodolfo due to pt able to perform functional mobility including up/down steps.   1/27/2023

## 2023-01-27 NOTE — ASSESSMENT & PLAN NOTE
-patient without routine medical care over the last 20yrs, uninsured, no hx of prior cancer screening  -no overt cancer risk factors.    -Metastatic disease to CNS/Lungs/abdomen/skeletal lesions  -IR consult to evaluate for CT guided biopsy - deferred to outpt  -Oncology consult to assist with further workup despite diffuse lesions patient has good performance status, stable renal/hepatic function  -No active pain complaints - PRN duo nebs, PRN anti-emetics and pain medications.   -derm consulted to biopsy skin nodules     Unscheduled

## 2023-01-27 NOTE — PT/OT/SLP EVAL
"Physical Therapy Evaluation/co eval with OT/D/C Summary    Patient Name:  Daryle Lynn Howard   MRN:  [id number]    Recommendations:     Discharge Recommendations: other (see comments)   Discharge Equipment Recommendations: none   Barriers to discharge: None pt lives alone and has 2 MAGI but is able to perform functional mobility    Assessment:     Daryle Lynn Howard is a 63 y.o. male admitted with a medical diagnosis of Multiple lesions of metastatic malignancy.  PT D/Rodolfo due to pt is able to perform functional mobility including up/down steps.    Recent Surgery: * No surgery found *      Plan:       (D/C PT due to pt able to perform functional mobility including up/down steps)   Plan of Care Expires:  02/26/23    Subjective   "I can walk"  Pain/Comfort:  Pain Rating 1: 0/10  Pain Rating Post-Intervention 1: 0/10    Patients cultural, spiritual, Druze conflicts given the current situation: no    Living Environment:  [facility] 2 MAGI with no rails  Prior to admission, patients level of function was independent.  Equipment used at home: none.  Upon discharge, patient will have assistance from unknown.    Objective:     Communicated with nurse prior to session.  Patient found supine with telemetry  upon PT entry to room.    General Precautions: Standard, fall  Orthopedic Precautions:N/A   Braces: N/A  Respiratory Status: Room air    Exams:  Cognitive Exam:  Patient is oriented to Person, Place, and Time  Sensation:    -       Intact  light/touch B LE  RLE ROM: WFL  RLE Strength: WFL  LLE ROM: WFL  LLE Strength: WFL    Functional Mobility:  Bed Mobility:     Supine to Sit: independence  Sit to Supine: independence  Transfers:     Sit to Stand:  independence with no AD  Gait: 10ft x 2 trials to/from bathroom then 200ft with no AD with independence . Pt performed gait with increased stance phase on the L vs R LE. Pt performed standing balance activities : high knee gait and ambulated " backwards with no instability noted.  Stairs:  Pt ascended/descended 10 stair(s) with No Assistive Device with bilateral handrails with Modified Independent. Pt then went up/down 3 steps with no AD or rail support with supervision for verbal cues to slow pace.    AM-PAC 6 CLICK MOBILITY  Total Score:24     Treatment & Education:  Pt ambulated to the bathroom as above and urinated on the commode. Pt able to don and doff his pants.  Co-treat with OT to allow pt to be seen by both disciplines.  Patient left supine with all lines intact, call button in reach, bed alarm on, nurse notified, and sisters present.    GOALS:   Multidisciplinary Problems       Physical Therapy Goals       Not on file              Multidisciplinary Problems (Resolved)          Problem: Physical Therapy    Goal Priority Disciplines Outcome Goal Variances Interventions   Physical Therapy Goal   (Resolved)     PT, PT/OT Met                         History:     History reviewed. No pertinent past medical history.    History reviewed. No pertinent surgical history.    Time Tracking:     PT Received On: 01/27/23  PT Start Time: 1136     PT Stop Time: 1157  PT Total Time (min): 21 min     Billable Minutes: Evaluation 11 and Gait Training 10      01/27/2023

## 2023-01-27 NOTE — SUBJECTIVE & OBJECTIVE
Interval History: No acute events. Pt has no complaints, feels well.  Afebrile    Review of Systems  Objective:     Vital Signs (Most Recent):  Temp: 97.9 °F (36.6 °C) (01/27/23 1138)  Pulse: 78 (01/27/23 1138)  Resp: 16 (01/27/23 1138)  BP: (!) 153/81 (01/27/23 1138)  SpO2: 95 % (01/27/23 1138)   Vital Signs (24h Range):  Temp:  [97.6 °F (36.4 °C)-98.8 °F (37.1 °C)] 97.9 °F (36.6 °C)  Pulse:  [] 78  Resp:  [16-20] 16  SpO2:  [94 %-96 %] 95 %  BP: (127-159)/(67-83) 153/81     Weight: 90.7 kg (200 lb)  Body mass index is 31.32 kg/m².  No intake or output data in the 24 hours ending 01/27/23 1306   Physical Exam  Constitutional:       General: He is not in acute distress.     Appearance: He is not ill-appearing.      Comments: Some temporal wasting   HENT:      Head: Normocephalic and atraumatic.      Comments: Left frontal subcutaneous nodule     Mouth/Throat:      Pharynx: Oropharynx is clear. No oropharyngeal exudate.      Comments: Missing teeth  Eyes:      General: No scleral icterus.     Extraocular Movements: Extraocular movements intact.      Pupils: Pupils are equal, round, and reactive to light.   Cardiovascular:      Rate and Rhythm: Normal rate and regular rhythm.      Pulses: Normal pulses.   Pulmonary:      Effort: Pulmonary effort is normal. No respiratory distress.   Abdominal:      General: There is no distension.      Palpations: Abdomen is soft.      Tenderness: There is no abdominal tenderness. There is no guarding.   Skin:     General: Skin is warm and dry.   Neurological:      General: No focal deficit present.      Mental Status: He is alert and oriented to person, place, and time.       MELD-Na score: 11 at 1/27/2023  4:27 AM  MELD score: 11 at 1/27/2023  4:27 AM  Calculated from:  Serum Creatinine: 1.4 mg/dL at 1/27/2023  4:27 AM  Serum Sodium: 141 mmol/L (Using max of 137 mmol/L) at 1/27/2023  4:27 AM  Total Bilirubin: 0.4 mg/dL (Using min of 1 mg/dL) at 1/27/2023  4:27 AM  INR(ratio):  1.1 at 1/25/2023  3:18 PM  Age: 63 years    Significant Labs:  CBC:  Recent Labs   Lab 01/26/23  0445 01/26/23  2350 01/27/23  0427   WBC 8.50 9.47 9.23   HGB 11.5* 11.9* 11.6*   HCT 36.8* 38.0* 36.9*    319 344     CMP:  Recent Labs   Lab 01/25/23  1518 01/26/23  0445 01/27/23  0427    137 141   K 4.1 4.7 4.9    105 105   CO2 24 22* 25   GLU 87 134* 147*   BUN 18 16 21   CREATININE 1.2 1.0 1.4   CALCIUM 9.8 10.1 9.8   PROT 8.2 7.9 8.0   ALBUMIN 3.3* 3.2* 3.2*   BILITOT 0.7 0.6 0.4   ALKPHOS 75 80 80   AST 26 20 20   ALT 20 17 19   ANIONGAP 12 10 11     PTINR:  Recent Labs   Lab 01/25/23  1518   INR 1.1       Significant Procedures:   Dobutamine Stress Test with Color Flow: No results found for this or any previous visit.

## 2023-01-27 NOTE — CONSULTS
Radiation Oncology Inpatient Consult Note                                                                                                                                 Date of Service: 1/27/2023     Chief Complaint: generalized fatigue and multiple brain and systemic lesions suspicious for a metastatic cancer     Reason for consult: consideration for radiation to the brain     Implantable devices: denies     Therapy to Date:  No radiation     Diagnosis/Assessment:   Daryle Lynn Howard is a 63 y.o. man who presented to [facility] as transfer from [facility], found to have multiple brain and systemic lesions suspicious for a metastatic cancer, no tissue pathology available yet     ECOG 1     Radiation oncology consulted for radiation to the brain  Patient is stable AAO x4, neurologically intact      Plan/Recommendations:     - no urgent need for whole brain irradiation at this time  - recommend tissue biopsy ASAP: can biopsy a skin/subcutaneous nodule if faster diagnosis  - recommend CEA and PSA (never had colonoscopy)  - continue CNS prophylaxis therapy  - rest of care per primary  - oldest brother Johnie Foley      HPI:   Daryle Lynn Howard is a 63 y.o. man who presented to [facility] as transfer from [facility], found to have multiple brain and systemic lesions suspicious for a metastatic cancer, no tissue pathology available yet     Relevant  history:  1/23/2023 CT CAP showed multiple masses in the lungs and in the soft tissues of the chest, abdomen and pelvis         1/25/2023 MRI brain w wo contrast showed multiple bilateral foci involving cerebrum and cerebellum,  largest 3.7  cm in the right temporal lobe.              Subjective:   I saw patient at bedside, accompanied his sisters who flew from [place]      Overall he reports feeling well.  Never had a colonoscopy  He denies any focal neurological deficits, headaches, confusion, nausea, vomiting, or dizziness.  He has a cough but no  hemoptysis.  He denies other major issues    The patient denies any history of radiation therapy, implantable cardiac devices, or connective tissue disease.       Social History     Tobacco Use    Smoking status: Former     Packs/day: 0.00     Types: Cigarettes    Smokeless tobacco: Never    Tobacco comments:     Rare cigarette use in his teens.   Substance Use Topics    Alcohol use: Yes     Alcohol/week: 6.0 standard drinks     Types: 6 Standard drinks or equivalent per week    Drug use: Not Currently     Types: Marijuana     Comment: THC in his teens     Family History   Problem Relation Age of Onset    Diverticulitis Mother     Prostate cancer Brother        Review of patient's allergies indicates:  No Known Allergies      No current facility-administered medications on file prior to encounter.     No current outpatient medications on file prior to encounter.       History reviewed. No pertinent surgical history.    History reviewed. No pertinent past medical history.      Review of Systems   Constitutional:  Positive for fatigue and unexpected weight change. Negative for fever.   HENT:  Negative for nasal congestion and sinus pressure/congestion.    Eyes:  Negative for visual disturbance.   Respiratory:  Positive for cough. Negative for shortness of breath and wheezing.    Cardiovascular:  Negative for chest pain, palpitations and leg swelling.   Gastrointestinal:  Negative for abdominal pain, blood in stool, constipation, diarrhea, nausea, vomiting and reflux.   Genitourinary:  Negative for dysuria and hematuria.   Musculoskeletal:  Negative for arthralgias, back pain and neck pain.   Integumentary:  Negative for rash.        Skin nodules   Neurological:  Negative for dizziness, seizures, speech difficulty, weakness, light-headedness, numbness, headaches and memory loss.   Psychiatric/Behavioral:  Negative for dysphoric mood. The patient is not nervous/anxious.         Objective:      Physical Exam  Vitals  reviewed.     Constitutional:       Appearance: Normal appearance.  Two sisters at bedside  HENT:      Head: NC/AT, 2cm soft non tender skin nodule over the RIGHT temple   Eyes:      Conjunctiva/sclera: Conjunctivae normal.   Cardiovascular:      Comments: extremities well perfused  Pulmonary:      Effort: Pulmonary effort is normal.   Abdominal:      General: There is no distension.  Subcutaneous skin nodules, soft non tender   Musculoskeletal:         General: Normal range of motion.      Cervical back: Normal range of motion.   Neurological:      General: No focal deficit present. CN 2-12 grossly intact, gait normal, no pronator drift, sensory or motor function grossly intact     Mental Status: Alert and oriented to person, place, and time.   Psychiatric:         Mood and Affect: Mood normal.         Behavior: Behavior normal.      Imaging: I have personally reviewed the patient's available images and reports and summarized pertinent findings above in HPI.        Thank you for the opportunity to care for this patient. Please do not hesitate to contact me with any questions.     Romero Salomon MD/PhD

## 2023-01-27 NOTE — SUBJECTIVE & OBJECTIVE
History reviewed. No pertinent past medical history.    History reviewed. No pertinent surgical history.  Family History       Problem Relation (Age of Onset)    Diverticulitis Mother    Prostate cancer Brother          Tobacco Use    Smoking status: Former     Packs/day: 0.00     Types: Cigarettes    Smokeless tobacco: Never    Tobacco comments:     Rare cigarette use in his teens.   Substance and Sexual Activity    Alcohol use: Yes     Alcohol/week: 6.0 standard drinks     Types: 6 Standard drinks or equivalent per week    Drug use: Not Currently     Types: Marijuana     Comment: THC in his teens    Sexual activity: Not on file       Review of patient's allergies indicates:  No Known Allergies    Medications:  Continuous Infusions:  Scheduled Meds:   dexAMETHasone  4 mg Oral Q6H    enoxaparin  1 mg/kg Subcutaneous Q12H    famotidine  20 mg Oral BID AC    levETIRAcetam  500 mg Oral BID     PRN Meds:acetaminophen, albuterol-ipratropium, dextrose 10%, dextrose 10%, glucagon (human recombinant), glucose, glucose, melatonin, naloxone, ondansetron, oxyCODONE, polyethylene glycol, prochlorperazine, senna-docusate 8.6-50 mg, sodium chloride 0.9%    Review of Systems   Constitutional:  Positive for weight loss and fatigue. Negative for fever, chills and night sweats.   Respiratory:  Negative for shortness of breath.    Cardiovascular:  Negative for chest pain.   Gastrointestinal:  Negative for nausea, vomiting, abdominal pain, diarrhea and constipation.   Musculoskeletal:  Negative for myalgias and arthralgias.   Skin:  Negative for itching and rash.   Objective:     Vital Signs (Most Recent):  Temp: 98.7 °F (37.1 °C) (01/27/23 1524)  Pulse: 80 (01/27/23 1524)  Resp: 18 (01/27/23 1524)  BP: (!) 141/80 (01/27/23 1524)  SpO2: (!) 94 % (01/27/23 1524)   Vital Signs (24h Range):  Temp:  [97.6 °F (36.4 °C)-98.8 °F (37.1 °C)] 98.7 °F (37.1 °C)  Pulse:  [] 80  Resp:  [16-20] 18  SpO2:  [94 %-96 %] 94 %  BP:  (127-159)/(67-83) 141/80     Weight: 90.7 kg (200 lb)  Body mass index is 31.32 kg/m².    Physical Exam   Constitutional: He appears well-developed and well-nourished.   Neurological: He is alert and oriented to person, place, and time.   Skin:   Areas Examined (abnormalities noted in diagram):   Scalp / Hair Palpated and Inspected  Head / Face Inspection Performed  Neck Inspection Performed  Chest / Axilla Inspection Performed  Abdomen Inspection Performed  Back Inspection Performed  RUE Inspected  LUE Inspection Performed  RLE Inspected  LLE Inspection Performed  Nails and Digits Inspection Performed  Gland Inspection Performed                       Significant Labs: All pertinent labs within the past 24 hours have been reviewed.    Significant Imaging: I have reviewed all pertinent imaging results/findings within the past 24 hours.

## 2023-01-27 NOTE — PROGRESS NOTES
Pt seen this am, sleeping comfortably, awoke to voice, denied any HA or other acute complaints, other than feeling tired. Neuro intact on brief exam.     Discussed with primary and Medical/Radiation Oncology teams.   Agree with obtaining biopsy from peripheral lesion for diagnostic purposes and to develop treatment plan.  No recommendation for neurosurgical intervention at this time.  We will sign off, please do not hesitate to contact our team with any questions or if we may be of further service.    Discussed with attending staff THADDEUS PaulinoC  Neurosurgery  [facility]

## 2023-01-27 NOTE — CONSULTS
[facility] ([facility])  Dermatology  Consult Note    Patient Name: Daryle Lynn Howard  MRN: [id number]  Admission Date: 1/25/2023  Hospital Length of Stay: 2 days  Attending Physician: Ba Rainey*  Primary Care Provider: Primary Doctor No     Inpatient consult to Dermatology  Consult performed by: Gina Davila MD  Consult ordered by: Ba Lopez MD        Subjective:     Principal Problem:Multiple lesions of metastatic malignancy    HPI:  62 yo M with no significant past medical history originally presented to OSH with complaints of fatigue. Imaging performed and patient found to have numerous lesions in the lungs, liver, kidneys, pelvis, brain, and lytic bone lesions concerning for metastatic disease from an unknown primary. Patient transferred to [facility] for further evaluation. Primary team recommended CT-guided biopsy by IR but per IR this needs to be completed on an outpatient basis. Dermatology consulted for biopsy of one of the subcutaneous lesions. Patient reports he had been feeling increasingly fatigued over the past few weeks and has had a significant weight loss of about 50 lbs, but thinks this is due to dietary changes. Reports bumps have showed up on his forehead, scalp, chest, back, and left hip about 6 months ago. They are not painful or bothersome, denies any associated redness, inflammation, or drainage from lesions. Denies any associated itching or rashes.      History reviewed. No pertinent past medical history.    History reviewed. No pertinent surgical history.  Family History       Problem Relation (Age of Onset)    Diverticulitis Mother    Prostate cancer Brother          Tobacco Use    Smoking status: Former     Packs/day: 0.00     Types: Cigarettes    Smokeless tobacco: Never    Tobacco comments:     Rare cigarette use in his teens.   Substance and Sexual Activity    Alcohol use: Yes     Alcohol/week: 6.0 standard drinks     Types: 6 Standard  drinks or equivalent per week    Drug use: Not Currently     Types: Marijuana     Comment: THC in his teens    Sexual activity: Not on file       Review of patient's allergies indicates:  No Known Allergies    Medications:  Continuous Infusions:  Scheduled Meds:   dexAMETHasone  4 mg Oral Q6H    enoxaparin  1 mg/kg Subcutaneous Q12H    famotidine  20 mg Oral BID AC    levETIRAcetam  500 mg Oral BID     PRN Meds:acetaminophen, albuterol-ipratropium, dextrose 10%, dextrose 10%, glucagon (human recombinant), glucose, glucose, melatonin, naloxone, ondansetron, oxyCODONE, polyethylene glycol, prochlorperazine, senna-docusate 8.6-50 mg, sodium chloride 0.9%    Review of Systems   Constitutional:  Positive for weight loss and fatigue. Negative for fever, chills and night sweats.   Respiratory:  Negative for shortness of breath.    Cardiovascular:  Negative for chest pain.   Gastrointestinal:  Negative for nausea, vomiting, abdominal pain, diarrhea and constipation.   Musculoskeletal:  Negative for myalgias and arthralgias.   Skin:  Negative for itching and rash.   Objective:     Vital Signs (Most Recent):  Temp: 98.7 °F (37.1 °C) (01/27/23 1524)  Pulse: 80 (01/27/23 1524)  Resp: 18 (01/27/23 1524)  BP: (!) 141/80 (01/27/23 1524)  SpO2: (!) 94 % (01/27/23 1524)   Vital Signs (24h Range):  Temp:  [97.6 °F (36.4 °C)-98.8 °F (37.1 °C)] 98.7 °F (37.1 °C)  Pulse:  [] 80  Resp:  [16-20] 18  SpO2:  [94 %-96 %] 94 %  BP: (127-159)/(67-83) 141/80     Weight: 90.7 kg (200 lb)  Body mass index is 31.32 kg/m².    Physical Exam   Constitutional: He appears well-developed and well-nourished.   Neurological: He is alert and oriented to person, place, and time.   Skin:   Areas Examined (abnormalities noted in diagram):   Scalp / Hair Palpated and Inspected  Head / Face Inspection Performed  Neck Inspection Performed  Chest / Axilla Inspection Performed  Abdomen Inspection Performed  Back Inspection Performed  RUE Inspected  LUE  Inspection Performed  RLE Inspected  LLE Inspection Performed  Nails and Digits Inspection Performed  Gland Inspection Performed                       Significant Labs: All pertinent labs within the past 24 hours have been reviewed.    Significant Imaging: I have reviewed all pertinent imaging results/findings within the past 24 hours.      Assessment/Plan:     Subcutaneous nodules  64 yo M with no significant past medical history who presents with fatigue, weight loss, and imaging findings of lytic bone lesions, lymphadenopathy, lesions in the brain, lungs, kidneys, liver, pelvis, and subcutaneous lesions concerning for metastatic disease of unknown primary. Dermatology consulted for biopsy of subcutaneous lesion.  - Punch biopsy of right scalp lesion performed today, as below  - Will follow up results  - Patient will need suture removal in 10-14 days  - Keep biopsy site covered with vaseline and bandage  - All other recommendations per primary team    Punch biopsy procedure note: right scalp  Punch biopsy performed after verbal consent obtained. Area marked and prepped with alcohol. Approximately 2cc of 1% bupivicane with epinephrine injected. 4 mm disposable punch used to remove lesion. Hemostasis obtained and biopsy site closed with 1 - 2 Prolene sutures. Wound care instructions reviewed with patient.            Thank you for your consult. I will sign off. Please contact us if you have any additional questions.    Gina Davila MD  Dermatology  [facility]surgery ([facility])

## 2023-01-27 NOTE — CONSULTS
[facility] - Neurosurgery ([facility])  Pulmonology  Consult Note    Patient Name: Daryle Lynn Howard  MRN: [id number]  Admission Date: 1/25/2023  Hospital Length of Stay: 2 days  Code Status: Full Code  Attending Physician: Ba Rainey*  Primary Care Provider: Primary Doctor No   Principal Problem: Multiple lesions of metastatic malignancy    Inpatient consult to Pulmonology  Consult performed by: Angela Bocanegra MD  Consult ordered by: Ba Lopez MD  Reason for consult: possible lung biopsy; IR unable to perform inpatient        Subjective:     HPI:  Daryle Lynn Howard is a 62 YO M w/ no chronic PMHx who has not sought medical care in past 20 years presents as a transfer from [facility] for neurosurgical evaluation with concerns of metastatic malignancy of unknown primary source with brain metastases and concomitant vasogenic edema. The patient was initially seen 1/23in the ED department at [facility], presenting with complaints of 1 month of fatigue and a 60 lb weight loss over the past year. Initial CXR showed innumerable pulmonary masses. Subsequent CT chest abdomen pelvis revealed numerous pulmonary lesions, as well as abdominal lesions, lytic bone lesions and a large pelvic mass, all concerning for signs of a metastatic malignancy, as well as 3 areas of VTE.  Denies prior sureries, no prior history is of a malignancy diagnosis. He lives alone and works at [employer], working as a .  Denies tobacco and illicit drug use, reports approximately 6 alcoholic drinks per week. Denies prior  service, incarceration, chemical exposures and or asbestos exposures in his working career.     Pulmonology was consulted for possible biopsy of lung lesions as IR unable to perform inpatient. Patient currently maintaining O2 sats >94% ORA. Denies significant shortness of breath. Reports cough, non productive.       History reviewed. No pertinent past medical history.    History  reviewed. No pertinent surgical history.    Review of patient's allergies indicates:  No Known Allergies    Family History       Problem Relation (Age of Onset)    Diverticulitis Mother    Prostate cancer Brother          Tobacco Use    Smoking status: Former     Packs/day: 0.00     Types: Cigarettes    Smokeless tobacco: Never    Tobacco comments:     Rare cigarette use in his teens.   Substance and Sexual Activity    Alcohol use: Yes     Alcohol/week: 6.0 standard drinks     Types: 6 Standard drinks or equivalent per week    Drug use: Not Currently     Types: Marijuana     Comment: THC in his teens    Sexual activity: Not on file         Review of Systems   Constitutional:  Positive for activity change, fatigue and unexpected weight change (60 lb weight loss). Negative for chills and fever.   HENT:  Negative for congestion and trouble swallowing.    Eyes:  Negative for visual disturbance.   Respiratory:  Negative for cough, shortness of breath, wheezing and stridor.    Cardiovascular:  Negative for chest pain and leg swelling.   Gastrointestinal:  Negative for abdominal distention, abdominal pain, blood in stool, diarrhea, nausea and vomiting.   Endocrine: Negative for polyuria.   Genitourinary:  Negative for difficulty urinating and dysuria.   Musculoskeletal:  Negative for arthralgias and myalgias.   Skin:  Negative for color change and rash.   Neurological:  Positive for weakness. Negative for dizziness, numbness and headaches.   Psychiatric/Behavioral:  Negative for agitation and confusion.    Objective:     Vital Signs (Most Recent):  Temp: 98.2 °F (36.8 °C) (01/27/23 0750)  Pulse: 91 (01/27/23 0829)  Resp: 20 (01/27/23 0750)  BP: (!) 151/83 (01/27/23 0750)  SpO2: (!) 94 % (01/27/23 0750)   Vital Signs (24h Range):  Temp:  [97.6 °F (36.4 °C)-99.5 °F (37.5 °C)] 98.2 °F (36.8 °C)  Pulse:  [] 91  Resp:  [16-20] 20  SpO2:  [94 %-96 %] 94 %  BP: (127-159)/(67-83) 151/83     Weight: 90.7 kg (200  lb)  Body mass index is 31.32 kg/m².    No intake or output data in the 24 hours ending 01/27/23 0906    Physical Exam  Vitals and nursing note reviewed.   Constitutional:       General: He is not in acute distress.     Appearance: Normal appearance. He is not ill-appearing.   HENT:      Head: Normocephalic and atraumatic.      Right Ear: External ear normal.      Left Ear: External ear normal.      Nose: Nose normal.      Mouth/Throat:      Mouth: Mucous membranes are dry.      Pharynx: Oropharynx is clear.   Eyes:      Extraocular Movements: Extraocular movements intact.      Conjunctiva/sclera: Conjunctivae normal.      Pupils: Pupils are equal, round, and reactive to light.   Cardiovascular:      Rate and Rhythm: Normal rate and regular rhythm.      Pulses: Normal pulses.      Heart sounds: Normal heart sounds. No murmur heard.  Pulmonary:      Effort: Pulmonary effort is normal. No respiratory distress.      Breath sounds: No wheezing or rales.   Abdominal:      General: Abdomen is flat. There is no distension.      Palpations: Abdomen is soft.      Tenderness: There is no abdominal tenderness.   Musculoskeletal:         General: No swelling. Normal range of motion.      Cervical back: Normal range of motion.      Right lower leg: No edema.      Left lower leg: No edema.   Skin:     General: Skin is warm and dry.   Neurological:      General: No focal deficit present.      Mental Status: He is alert and oriented to person, place, and time.   Psychiatric:         Mood and Affect: Mood normal.         Behavior: Behavior normal.       Vents:       Lines/Drains/Airways       Peripheral Intravenous Line  Duration                  Peripheral IV - Single Lumen 01/25/23 20 G Anterior;Right Forearm 2 days                    Significant Labs:    CBC/Anemia Profile:  Recent Labs   Lab 01/26/23  0445 01/26/23  2350 01/27/23  0427   WBC 8.50 9.47 9.23   HGB 11.5* 11.9* 11.6*   HCT 36.8* 38.0* 36.9*    319 344   MCV  93 94 94   RDW 14.2 14.4 14.6*        Chemistries:  Recent Labs   Lab 01/25/23  1518 01/26/23  0445 01/27/23  0427    137 141   K 4.1 4.7 4.9    105 105   CO2 24 22* 25   BUN 18 16 21   CREATININE 1.2 1.0 1.4   CALCIUM 9.8 10.1 9.8   ALBUMIN 3.3* 3.2* 3.2*   PROT 8.2 7.9 8.0   BILITOT 0.7 0.6 0.4   ALKPHOS 75 80 80   ALT 20 17 19   AST 26 20 20   MG  --  1.8 1.9   PHOS  --  2.9 3.0           Significant Imaging:   I have reviewed all pertinent imaging results/findings within the past 24 hours.  CT CAP: numerable soft tissue density masses throughout the lungs bilaterally. With associated mediastinal and hilar LAD.    Assessment/Plan:     Pulmonary nodules/lesions, multiple  CT chest demonstrating numerable soft tissue density masses throughout the lungs bilaterally with mediastinal and hilar lymphadenopathy. Patient denies shortness of breath, hemoptysis, productive cough. Imaging findings highly suggestive of metastatic disease.    Recommendations:  --will determine if patient is appropriate candidate for inpatient bronchoscopy and biopsy; however, would not be performed until 1/30 at the earliest  --patient stable, on room air; may benefit more from outpatient IR biopsy to guarantee adequate sample collection          Thank you for your consult. Please reach out if there are any questions or concerns.      Angela Bocanegra MD  Pulmonology  [facility] - Neurosurgery ([facility])

## 2023-01-27 NOTE — PLAN OF CARE
[facility] - Neurosurgery (Hospital)  Initial Discharge Assessment       Primary Care Provider: Primary Doctor No    Admission Diagnosis: Pulmonary embolism [I26.99]  Brain metastasis [C79.31]  Chest pain [R07.9]    Admission Date: 1/25/2023  Expected Discharge Date:     Discharge Barriers Identified: (P) Unisured    Payor: MEDICAID / Plan: PENDING MEDICAID / Product Type: Government /     Extended Emergency Contact Information  Primary Emergency Contact: Johnie Foley  Mobile Phone: [phone]  Relation: Brother  Secondary Emergency Contact: Bernice Foley  Mobile Phone: [phone]  Relation: Mother    Discharge Plan A: (P) Home Health       No Pharmacies Listed    Initial Assessment (most recent)       Adult Discharge Assessment - 01/27/23 1033          Discharge Assessment    Assessment Type Discharge Planning Assessment (P)      Confirmed/corrected address, phone number and insurance Yes (P)      Confirmed Demographics Correct on Facesheet (P)      Source of Information patient;family (P)      When was your last doctors appointment? -- (P)    patient does not have a PCP    Communicated DOMITILA with patient/caregiver Date not available/Unable to determine (P)      Reason For Admission PE, Cancer work-up (P)      People in Home alone (P)      Facility Arrived From: [facility] (P)      Do you expect to return to your current living situation? Yes (P)      Do you have help at home or someone to help you manage your care at home? No (P)      Prior to hospitilization cognitive status: Unable to Assess (P)      Current cognitive status: Alert/Oriented (P)      Equipment Currently Used at Home none (P)      Readmission within 30 days? No (P)      Patient currently being followed by outpatient case management? No (P)      Do you currently have service(s) that help you manage your care at home? No (P)      Do you take prescription medications? No (P)      Do you have prescription coverage? No (P)      Do you have  any problems affording any of your prescribed medications? Yes (P)      Who is going to help you get home at discharge? Patient's brother haritha or his niece (P)      How do you get to doctors appointments? car, drives self;family or friend will provide (P)      Are you on dialysis? No (P)      Do you take coumadin? No (P)      Discharge Plan A Home Health (P)      DME Needed Upon Discharge  other (see comments) (P)    pending recs, TBD    Discharge Plan discussed with: Patient;Sibling (P)      Name(s) and Number(s) Karen [phone] and Maribel [phone] (P)      Discharge Barriers Identified Unisured (P)                      SW met with patient and his 2 sisters, Karen and Maribel, at bedside.  [facility].  He does not have any DME or HH services and is not on HD or Coumadin.  Patient independent, driving and working prior to his admission.  Patient is uninsured and, upon review, it appears application was submitted on admission.  Patient's family concerned about how he will get f/u and treatment for his cancer if he is discharged without insurance.  SW answered all questions and reached out to the provider to let them know about concerns.      Marziol Dasilva, MARILYN  [facility]  [phone]

## 2023-01-27 NOTE — HPI
Daryle Lynn Howard is a 64 YO M w/ no chronic PMHx who has not sought medical care in past 20 years presents as a transfer from [facility] for neurosurgical evaluation with concerns of metastatic malignancy of unknown primary source with brain metastases and concomitant vasogenic edema. The patient was initially seen 1/23in the ED department at [facility], presenting with complaints of 1 month of fatigue and a 60 lb weight loss over the past year. Initial CXR showed innumerable pulmonary masses. Subsequent CT chest abdomen pelvis revealed numerous pulmonary lesions, as well as abdominal lesions, lytic bone lesions and a large pelvic mass, all concerning for signs of a metastatic malignancy, as well as 3 areas of VTE.  Denies prior sureries, no prior history is of a malignancy diagnosis. He lives alone and works at [employer], working as a .  Denies tobacco and illicit drug use, reports approximately 6 alcoholic drinks per week. Denies prior  service, incarceration, chemical exposures and or asbestos exposures in his working career.     Pulmonology was consulted for possible biopsy of lung lesions as IR unable to perform inpatient. Patient currently maintaining O2 sats >94% ORA. Denies significant shortness of breath. Reports cough, non productive.

## 2023-01-27 NOTE — HPI
62 yo M with no significant past medical history originally presented to OSH with complaints of fatigue. Imaging performed and patient found to have numerous lesions in the lungs, liver, kidneys, pelvis, brain, and lytic bone lesions concerning for metastatic disease from an unknown primary. Patient transferred to [facility] for further evaluation. Primary team recommended CT-guided biopsy by IR but per IR this needs to be completed on an outpatient basis. Dermatology consulted for biopsy of one of the subcutaneous lesions. Patient reports he had been feeling increasingly fatigued over the past few weeks and has had a significant weight loss of about 50 lbs, but thinks this is due to dietary changes. Reports bumps have showed up on his forehead, scalp, chest, back, and left hip about 6 months ago. They are not painful or bothersome, denies any associated redness, inflammation, or drainage from lesions. Denies any associated itching or rashes.

## 2023-01-27 NOTE — ASSESSMENT & PLAN NOTE
62 yo M with no significant past medical history who presents with fatigue, weight loss, and imaging findings of lytic bone lesions, lymphadenopathy, lesions in the brain, lungs, kidneys, liver, pelvis, and subcutaneous lesions concerning for metastatic disease of unknown primary. Dermatology consulted for biopsy of subcutaneous lesion.  - Punch biopsy of right scalp lesion performed today, as below  - Will follow up results  - Patient will need suture removal in 10-14 days  - Keep biopsy site covered with vaseline and bandage  - All other recommendations per primary team    Punch biopsy procedure note: right scalp  Punch biopsy performed after verbal consent obtained. Area marked and prepped with alcohol. Approximately 2cc of 1% bupivicane with epinephrine injected. 4 mm disposable punch used to remove lesion. Hemostasis obtained and biopsy site closed with 1 - 2 Prolene sutures. Wound care instructions reviewed with patient.

## 2023-01-27 NOTE — ASSESSMENT & PLAN NOTE
CT chest demonstrating numerable soft tissue density masses throughout the lungs bilaterally with mediastinal and hilar lymphadenopathy. Patient denies shortness of breath, hemoptysis, productive cough. Imaging findings highly suggestive of metastatic disease.    Recommendations:  --will determine if patient is appropriate candidate for inpatient bronchoscopy and biopsy; however, would not be performed until 1/30 at the earliest  --patient stable, on room air; may benefit more from outpatient IR biopsy to guarantee adequate sample collection

## 2023-01-27 NOTE — PT/OT/SLP EVAL
"Occupational Therapy   Co-Evaluation/Tx and Discharge Note    Name: Daryle Lynn Howard  MRN: [id number]  Admitting Diagnosis: Multiple lesions of metastatic malignancy  Recent Surgery: * No surgery found *      Recommendations:     Discharge Recommendations: other (see comments)  Discharge Equipment Recommendations: shower chair  Barriers to discharge:  None    Assessment:     Daryle Lynn Howard is a 63 y.o. male with a medical diagnosis of Multiple lesions of metastatic malignancy. At this time, patient is functioning at their prior level of function and does not require further acute OT services.     Plan:     During this hospitalization, patient does not require further acute OT services.  Please re-consult if situation changes.    Plan of Care Reviewed with: patient, family    Subjective   "I can do it myself"  Chief Complaint: Pt stated none  Patient/Family Comments/goals: Return home     Occupational Profile:  Living Environment: Pt lives alone in a [employer] trailer with 2 MAGI and no HR.  Previous level of function: (I) with ADLs and mobility   Roles and Routines: Pt works, however does not drive.   Equipment Used at home: none  Assistance upon Discharge: Unknown    Pain/Comfort:  Pain Rating 1: 0/10  Pain Rating Post-Intervention 1: 0/10    Patients cultural, spiritual, Jainism conflicts given the current situation: no    Objective:     Communicated with: RN prior to session.  Patient found HOB elevated with telemetry upon OT entry to room.    General Precautions: Standard, fall  Orthopedic Precautions: N/A  Braces: N/A  Respiratory Status: Room air     Occupational Performance:    Bed Mobility:    Patient completed Supine to Sit with independence  Patient completed Sit to Supine with independence    Functional Mobility/Transfers:  Patient completed Sit <> Stand Transfer with independence  with  no assistive device   Patient completed Toilet Transfer Step Transfer technique with independence with  no " AD  Functional Mobility: Pt performed in room ambulation to/from bathroom to simulate home distances and in hallway and stairs for simulating community mobility. Pt demonstrated ability to ambulate 200ft and ascended/descended 10 stairs with B HE and Mod I and 3 steps with no AD or rails with SPV for safety.      Activities of Daily Living:  Grooming: modified independence performed facial hygiene while sitting EOB   Lower Body Dressing: modified independence pt demonstrated ability to buckle/zip pants with no difficulty while supine in bed  Toileting: independence with standing at toilet with clothing management and perineal hygiene     Cognitive/Visual Perceptual:  Cognitive/Psychosocial Skills:     -       Oriented to: Person, Place, Time, and Situation   -       Follows Commands/attention:Follows multistep  commands  -       Communication: clear/fluent  Visual/Perceptual:      -Impaired  tracking and visual field R side.    Physical Exam:  Balance:    -       Good sitting and standing balance  Postural examination/scapula alignment:    -       No postural abnormalities identified  Skin integrity: Visible skin intact  Edema:  None noted  Sensation:    -       Intact  Upper Extremity Range of Motion:     -       Right Upper Extremity: WNL  -       Left Upper Extremity: WNL  Upper Extremity Strength:    -       Right Upper Extremity: WFL  -       Left Upper Extremity: WFL   Strength:    -       Right Upper Extremity: WFL  -       Left Upper Extremity: WFL  Fine Motor Coordination:    -       Intact  Left hand thumb/finger opposition skills and Right hand thumb/finger opposition skills    AMPAC 6 Click ADL:  AMPAC Total Score: 24    Treatment & Education:  Pt and family present educated on:   Role of OT, POC, and d/c planning.   Importance of OOB activities to increase endurance and tolerance for increased participation in daily ADLs.   Utilizing the call bell to request for assistance with all functional  mobility to ensure safety during hospital stay.   Pt denied having any concerns with his current performance of ADLs and functional mobility. Pt reports being at baseline PLOF.    Home safety techniques     Pt and family verbalized understanding and all questions were addressed within the scope of OT.       Patient left supine with all lines intact, call button in reach, RN notified, and family present    GOALS:   Multidisciplinary Problems       Occupational Therapy Goals          Problem: Occupational Therapy    Goal Priority Disciplines Outcome Interventions   Occupational Therapy Goal     OT, PT/OT Ongoing, Progressing                        History:     History reviewed. No pertinent past medical history.    History reviewed. No pertinent surgical history.    Time Tracking:     OT Date of Treatment: 01/27/23  OT Start Time: 1136  OT Stop Time: 1157  OT Total Time (min): 21 min    Billable Minutes:Evaluation 11  Therapeutic Activity 10    1/27/2023  Co-evaluation/treatment performed due to patient's multiple deficits requiring two skilled therapists to appropriately and safely assess patient's strength, endurance, functional mobility, and ADL performance while facilitating functional tasks in addition to accommodating for patient's activity tolerance and medical acuity.

## 2023-01-27 NOTE — HOSPITAL COURSE
"ED treatment, Decadron 8 mg IV prior to arrival, Keppra 1 g IV. Patient admitted to hospital medicine. NSGY, Medical oncology consulted. MRI brain with "1. Multiple cerebral and cerebellar masses consistent with metastatic disease.  The largest is in the right temporal lobe measuring approximately 3.7 cm.  Mild associated vasogenic edema. 2. Multiple small enhancing masses in the scalp and posterior paraspinal musculature in the upper neck suggesting metastatic disease." NSGY rec IR consult for biopsy but IR deferred to outpatient. No intervention per NSGY. Rad/Onc consulted for evaluation WBR and rec to follow up after tissue diagnosis along with med onc. Continued on keppra, decadron, lovenox. CM consulted to assist patient obtaining insurance. Dermatology consulted for biopsy scalp mass which patient underwent 1/27, path pending. Patient felt well and had no complaints. Patient deemed ready for discharge with plan for outpatient follow-up to follow up pending path results. Plan discussed with pt, who was agreeable and amenable; medications were discussed and reviewed, outpatient follow-up arranged, ER precautions were given, all questions were answered to the pt's satisfaction, and Daryle Lynn Howard  was subsequently discharged.    "

## 2023-01-27 NOTE — PROGRESS NOTES
Jesus Bernal - Neurosurgery ([facility])  [facility] Medicine  Progress Note    Patient Name: Daryle Lynn Howard  MRN: [id number]  Patient Class: IP- Inpatient   Admission Date: 1/25/2023  Length of Stay: 2 days  Attending Physician: Ba Rainey*  Primary Care Provider: Primary Doctor No        Subjective:     Principal Problem:Multiple lesions of metastatic malignancy        HPI:  63-year-old man with no chronic medical history not receiving outpatient medical care for last 20 years presents as a transfer from [facility] for concerns of metastatic malignancy of unknown primary with brain metastases with vasogenic edema.     He was initially seen January 23rd in the ED department at [facility], presenting with complaints of 1 month of fatigue, noted to have rales on pulmonary exam initial chest x-ray showed innumerable pulmonary masses and he had a subsequent CT chest abdomen pelvis that has revealed numerous pulmonary lesions, as well as abdominal lesions, lytic bone lesions and a large pelvic mass all concerning for signs of a metastatic malignancy, and 3 areas of venous thromboembolism.  He had no prior surgeries, no prior history is of a malignancy diagnosis, he lives alone and works at [employer].  No history of tob,acco use or smoking, drinking approximately 6 alcoholic drinks per week and denies any history of drug use, no prior  service no report of chemical exposures and or asbestos exposures in his working career.    He was plan for admission but ultimately left against medical advise for personal reasons and then re-presented to the emergency department there in home today and a CT head was completed which showed brain lesions with associated vasogenic edema and a and a transfer was requested and Neurosurgery recommended the ED to ED transfer.    On discussion with patient besides fatigue and occasional cough and some shortness of breath with exertion that is been occurring since  [place] he denies any other symptoms such as chest pain, nausea vomiting, fevers chills myalgias and or unexpected weight loss he has not had appetite changes denies any bone or extremity pain also denies any bowel symptoms and or lower urinary tract symptoms.  States he has seen part of his imaging at the prior hospital offered to review imaging with patient this evening, discussed plan for steroids, seizure medications and anticoagulation    ED treatment, Decadron 8 mg IV prior to arrival, Keppra 1 g IV since arrival      Overview/Hospital Course:  Patient admitted to hospital medicine. NSGY, Medical oncology consulted.      Interval History: No acute events. Pt has no complaints, feels well.  Afebrile    Review of Systems  Objective:     Vital Signs (Most Recent):  Temp: 97.9 °F (36.6 °C) (01/27/23 1138)  Pulse: 78 (01/27/23 1138)  Resp: 16 (01/27/23 1138)  BP: (!) 153/81 (01/27/23 1138)  SpO2: 95 % (01/27/23 1138)   Vital Signs (24h Range):  Temp:  [97.6 °F (36.4 °C)-98.8 °F (37.1 °C)] 97.9 °F (36.6 °C)  Pulse:  [] 78  Resp:  [16-20] 16  SpO2:  [94 %-96 %] 95 %  BP: (127-159)/(67-83) 153/81     Weight: 90.7 kg (200 lb)  Body mass index is 31.32 kg/m².  No intake or output data in the 24 hours ending 01/27/23 1306   Physical Exam  Constitutional:       General: He is not in acute distress.     Appearance: He is not ill-appearing.      Comments: Some temporal wasting   HENT:      Head: Normocephalic and atraumatic.      Comments: Left frontal subcutaneous nodule     Mouth/Throat:      Pharynx: Oropharynx is clear. No oropharyngeal exudate.      Comments: Missing teeth  Eyes:      General: No scleral icterus.     Extraocular Movements: Extraocular movements intact.      Pupils: Pupils are equal, round, and reactive to light.   Cardiovascular:      Rate and Rhythm: Normal rate and regular rhythm.      Pulses: Normal pulses.   Pulmonary:      Effort: Pulmonary effort is normal. No respiratory distress.    Abdominal:      General: There is no distension.      Palpations: Abdomen is soft.      Tenderness: There is no abdominal tenderness. There is no guarding.   Skin:     General: Skin is warm and dry.   Neurological:      General: No focal deficit present.      Mental Status: He is alert and oriented to person, place, and time.       MELD-Na score: 11 at 1/27/2023  4:27 AM  MELD score: 11 at 1/27/2023  4:27 AM  Calculated from:  Serum Creatinine: 1.4 mg/dL at 1/27/2023  4:27 AM  Serum Sodium: 141 mmol/L (Using max of 137 mmol/L) at 1/27/2023  4:27 AM  Total Bilirubin: 0.4 mg/dL (Using min of 1 mg/dL) at 1/27/2023  4:27 AM  INR(ratio): 1.1 at 1/25/2023  3:18 PM  Age: 63 years    Significant Labs:  CBC:  Recent Labs   Lab 01/26/23  0445 01/26/23  2350 01/27/23  0427   WBC 8.50 9.47 9.23   HGB 11.5* 11.9* 11.6*   HCT 36.8* 38.0* 36.9*    319 344     CMP:  Recent Labs   Lab 01/25/23  1518 01/26/23  0445 01/27/23  0427    137 141   K 4.1 4.7 4.9    105 105   CO2 24 22* 25   GLU 87 134* 147*   BUN 18 16 21   CREATININE 1.2 1.0 1.4   CALCIUM 9.8 10.1 9.8   PROT 8.2 7.9 8.0   ALBUMIN 3.3* 3.2* 3.2*   BILITOT 0.7 0.6 0.4   ALKPHOS 75 80 80   AST 26 20 20   ALT 20 17 19   ANIONGAP 12 10 11     PTINR:  Recent Labs   Lab 01/25/23  1518   INR 1.1       Significant Procedures:   Dobutamine Stress Test with Color Flow: No results found for this or any previous visit.      Assessment/Plan:      * Multiple lesions of metastatic malignancy  -patient without routine medical care over the last 20yrs, uninsured, no hx of prior cancer screening  -no overt cancer risk factors.    -Metastatic disease to CNS/Lungs/abdomen/skeletal lesions  -IR consult to evaluate for CT guided biopsy - deferred to outpt  -Oncology consult to assist with further workup despite diffuse lesions patient has good performance status, stable renal/hepatic function  -No active pain complaints - PRN duo nebs, PRN anti-emetics and pain medications.    -derm consulted to biopsy skin nodules      Acute deep vein thrombosis of left iliac vein  Start on therapeutic lovenox 1mg/kg q12 at this time, transition to oral anticoagulant for discharge.       IVC thrombosis  Start on therapeutic lovenox 1mg/kg q12 at this time, transition to oral anticoagulant for discharge.       Vasogenic brain edema  As per metastasis to brain of unknown origin problem      Metastasis to brain of unknown origin  Seen by neurosurgery  -no operative intervention recommended  -no focal neurologic deficits noted   -continue Dexamethasone 4mg PO q6hrs   >famotidine stress ulcer prophylaxis  -continue Keppra 500mg PO BID as seizure prophylaxis, fall/seizure precautions       Other pulmonary embolism without acute cor pulmonale  Start on therapeutic lovenox 1mg/kg q12 at this time, transition to oral anticoagulant for discharge.       VTE Risk Mitigation (From admission, onward)         Ordered     enoxaparin injection 90 mg  Every 12 hours (non-standard times)         01/26/23 0032     IP VTE HIGH RISK PATIENT  Once         01/26/23 0032     Reason for No Pharmacological VTE Prophylaxis  Once        Question:  Reasons:  Answer:  Physician Provided (leave comment)    01/26/23 0032                Discharge Planning   DOMITILA:      Code Status: Full Code   Is the patient medically ready for discharge?: No    Reason for patient still in hospital (select all that apply): Patient trending condition and Treatment  Discharge Plan A: Home Health          Ba Lopez MD  Department of Hospital Medicine   [facility] - Neurosurgery ([facility])

## 2023-01-27 NOTE — SUBJECTIVE & OBJECTIVE
History reviewed. No pertinent past medical history.    History reviewed. No pertinent surgical history.    Review of patient's allergies indicates:  No Known Allergies    Family History       Problem Relation (Age of Onset)    Diverticulitis Mother    Prostate cancer Brother          Tobacco Use    Smoking status: Former     Packs/day: 0.00     Types: Cigarettes    Smokeless tobacco: Never    Tobacco comments:     Rare cigarette use in his teens.   Substance and Sexual Activity    Alcohol use: Yes     Alcohol/week: 6.0 standard drinks     Types: 6 Standard drinks or equivalent per week    Drug use: Not Currently     Types: Marijuana     Comment: THC in his teens    Sexual activity: Not on file         Review of Systems   Constitutional:  Positive for activity change, fatigue and unexpected weight change (60 lb weight loss). Negative for chills and fever.   HENT:  Negative for congestion and trouble swallowing.    Eyes:  Negative for visual disturbance.   Respiratory:  Negative for cough, shortness of breath, wheezing and stridor.    Cardiovascular:  Negative for chest pain and leg swelling.   Gastrointestinal:  Negative for abdominal distention, abdominal pain, blood in stool, diarrhea, nausea and vomiting.   Endocrine: Negative for polyuria.   Genitourinary:  Negative for difficulty urinating and dysuria.   Musculoskeletal:  Negative for arthralgias and myalgias.   Skin:  Negative for color change and rash.   Neurological:  Positive for weakness. Negative for dizziness, numbness and headaches.   Psychiatric/Behavioral:  Negative for agitation and confusion.    Objective:     Vital Signs (Most Recent):  Temp: 98.2 °F (36.8 °C) (01/27/23 0750)  Pulse: 91 (01/27/23 0829)  Resp: 20 (01/27/23 0750)  BP: (!) 151/83 (01/27/23 0750)  SpO2: (!) 94 % (01/27/23 0750)   Vital Signs (24h Range):  Temp:  [97.6 °F (36.4 °C)-99.5 °F (37.5 °C)] 98.2 °F (36.8 °C)  Pulse:  [] 91  Resp:  [16-20] 20  SpO2:  [94 %-96 %] 94 %  BP:  (127-159)/(67-83) 151/83     Weight: 90.7 kg (200 lb)  Body mass index is 31.32 kg/m².    No intake or output data in the 24 hours ending 01/27/23 0906    Physical Exam  Vitals and nursing note reviewed.   Constitutional:       General: He is not in acute distress.     Appearance: Normal appearance. He is not ill-appearing.   HENT:      Head: Normocephalic and atraumatic.      Right Ear: External ear normal.      Left Ear: External ear normal.      Nose: Nose normal.      Mouth/Throat:      Mouth: Mucous membranes are dry.      Pharynx: Oropharynx is clear.   Eyes:      Extraocular Movements: Extraocular movements intact.      Conjunctiva/sclera: Conjunctivae normal.      Pupils: Pupils are equal, round, and reactive to light.   Cardiovascular:      Rate and Rhythm: Normal rate and regular rhythm.      Pulses: Normal pulses.      Heart sounds: Normal heart sounds. No murmur heard.  Pulmonary:      Effort: Pulmonary effort is normal. No respiratory distress.      Breath sounds: No wheezing or rales.   Abdominal:      General: Abdomen is flat. There is no distension.      Palpations: Abdomen is soft.      Tenderness: There is no abdominal tenderness.   Musculoskeletal:         General: No swelling. Normal range of motion.      Cervical back: Normal range of motion.      Right lower leg: No edema.      Left lower leg: No edema.   Skin:     General: Skin is warm and dry.   Neurological:      General: No focal deficit present.      Mental Status: He is alert and oriented to person, place, and time.   Psychiatric:         Mood and Affect: Mood normal.         Behavior: Behavior normal.       Vents:       Lines/Drains/Airways       Peripheral Intravenous Line  Duration                  Peripheral IV - Single Lumen 01/25/23 20 G Anterior;Right Forearm 2 days                    Significant Labs:    CBC/Anemia Profile:  Recent Labs   Lab 01/26/23  0445 01/26/23  2350 01/27/23  0427   WBC 8.50 9.47 9.23   HGB 11.5* 11.9* 11.6*    HCT 36.8* 38.0* 36.9*    319 344   MCV 93 94 94   RDW 14.2 14.4 14.6*        Chemistries:  Recent Labs   Lab 01/25/23  1518 01/26/23  0445 01/27/23  0427    137 141   K 4.1 4.7 4.9    105 105   CO2 24 22* 25   BUN 18 16 21   CREATININE 1.2 1.0 1.4   CALCIUM 9.8 10.1 9.8   ALBUMIN 3.3* 3.2* 3.2*   PROT 8.2 7.9 8.0   BILITOT 0.7 0.6 0.4   ALKPHOS 75 80 80   ALT 20 17 19   AST 26 20 20   MG  --  1.8 1.9   PHOS  --  2.9 3.0           Significant Imaging:   I have reviewed all pertinent imaging results/findings within the past 24 hours.  CT CAP: numerable soft tissue density masses throughout the lungs bilaterally. With associated mediastinal and hilar LAD.

## 2023-01-27 NOTE — PLAN OF CARE
OT evaluation completed. Pt is currently performing functional mobility and ADLs at baseline function. OT d/c.     Problem: Occupational Therapy  Goal: Occupational Therapy Goal  Outcome: Ongoing, Progressing

## 2023-01-28 VITALS
OXYGEN SATURATION: 95 % | DIASTOLIC BLOOD PRESSURE: 82 MMHG | RESPIRATION RATE: 18 BRPM | BODY MASS INDEX: 19.58 KG/M2 | WEIGHT: 125 LBS | HEART RATE: 74 BPM | SYSTOLIC BLOOD PRESSURE: 141 MMHG | TEMPERATURE: 97 F

## 2023-01-28 LAB
B-HCG SERPL-ACNC: <0.6 IU/L
CANCER AG19-9 SERPL-ACNC: 20.6 U/ML (ref 0–40)
CEA SERPL-MCNC: 3.1 NG/ML (ref 0–5)
COMPLEXED PSA SERPL-MCNC: 0.67 NG/ML (ref 0–4)

## 2023-01-28 PROCEDURE — 99239 PR HOSPITAL DISCHARGE DAY,>30 MIN: ICD-10-PCS | Mod: ,,, | Performed by: STUDENT IN AN ORGANIZED HEALTH CARE EDUCATION/TRAINING PROGRAM

## 2023-01-28 PROCEDURE — 86301 IMMUNOASSAY TUMOR CA 19-9: CPT | Performed by: STUDENT IN AN ORGANIZED HEALTH CARE EDUCATION/TRAINING PROGRAM

## 2023-01-28 PROCEDURE — 25000003 PHARM REV CODE 250: Performed by: HOSPITALIST

## 2023-01-28 PROCEDURE — 63600175 PHARM REV CODE 636 W HCPCS: Performed by: HOSPITALIST

## 2023-01-28 PROCEDURE — 36415 COLL VENOUS BLD VENIPUNCTURE: CPT | Performed by: STUDENT IN AN ORGANIZED HEALTH CARE EDUCATION/TRAINING PROGRAM

## 2023-01-28 PROCEDURE — 84153 ASSAY OF PSA TOTAL: CPT | Performed by: STUDENT IN AN ORGANIZED HEALTH CARE EDUCATION/TRAINING PROGRAM

## 2023-01-28 PROCEDURE — 99239 HOSP IP/OBS DSCHRG MGMT >30: CPT | Mod: ,,, | Performed by: STUDENT IN AN ORGANIZED HEALTH CARE EDUCATION/TRAINING PROGRAM

## 2023-01-28 PROCEDURE — 82378 CARCINOEMBRYONIC ANTIGEN: CPT | Performed by: STUDENT IN AN ORGANIZED HEALTH CARE EDUCATION/TRAINING PROGRAM

## 2023-01-28 RX ORDER — DEXAMETHASONE 2 MG/1
2 TABLET ORAL EVERY 12 HOURS
Qty: 60 TABLET | Refills: 0 | Status: SHIPPED | OUTPATIENT
Start: 2023-01-28 | End: 2023-02-28

## 2023-01-28 RX ORDER — LEVETIRACETAM 500 MG/1
500 TABLET ORAL 2 TIMES DAILY
Qty: 60 TABLET | Refills: 11 | Status: SHIPPED | OUTPATIENT
Start: 2023-01-28 | End: 2024-01-28

## 2023-01-28 RX ORDER — BENZONATATE 100 MG/1
100 CAPSULE ORAL 3 TIMES DAILY PRN
Qty: 30 CAPSULE | Refills: 0 | Status: SHIPPED | OUTPATIENT
Start: 2023-01-28 | End: 2023-01-30 | Stop reason: SDUPTHER

## 2023-01-28 RX ORDER — FAMOTIDINE 20 MG/1
20 TABLET, FILM COATED ORAL 2 TIMES DAILY
Qty: 60 TABLET | Refills: 11 | Status: SHIPPED | OUTPATIENT
Start: 2023-01-28 | End: 2024-01-28

## 2023-01-28 RX ADMIN — DEXAMETHASONE 4 MG: 4 TABLET ORAL at 12:01

## 2023-01-28 RX ADMIN — LEVETIRACETAM 500 MG: 500 TABLET, FILM COATED ORAL at 09:01

## 2023-01-28 RX ADMIN — FAMOTIDINE 20 MG: 20 TABLET ORAL at 05:01

## 2023-01-28 RX ADMIN — ENOXAPARIN SODIUM 90 MG: 100 INJECTION SUBCUTANEOUS at 12:01

## 2023-01-28 RX ADMIN — DEXAMETHASONE 4 MG: 4 TABLET ORAL at 05:01

## 2023-01-28 NOTE — DISCHARGE SUMMARY
Jesus Bernal - Neurosurgery ([facility])  [facility] Medicine  Discharge Summary      Patient Name: Daryle Lynn Howard  MRN: [id number]  DAVID: [id number]  Patient Class: IP- Inpatient  Admission Date: 1/25/2023  Hospital Length of Stay: 3 days  Discharge Date and Time:  01/28/2023 2:01 PM  Attending Physician: Sandy att. 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     He was initially seen January 23rd in the ED department at [facility], presenting with complaints of 1 month of fatigue, noted to have rales on pulmonary exam initial chest x-ray showed innumerable pulmonary masses and he had a subsequent CT chest abdomen pelvis that has revealed numerous pulmonary lesions, as well as abdominal lesions, lytic bone lesions and a large pelvic mass all concerning for signs of a metastatic malignancy, and 3 areas of venous thromboembolism.  He had no prior surgeries, no prior history is of a malignancy diagnosis, he lives alone and works at [employer].  No history of tob,acco use or smoking, drinking approximately 6 alcoholic drinks per week and denies any history of drug use, no prior  service no report of chemical exposures and or asbestos exposures in his working career.    He was plan for admission but ultimately left against medical advise for personal reasons and then re-presented to the emergency department there in home today and a CT head was completed which showed brain lesions with associated vasogenic edema and a and a transfer was requested and Neurosurgery recommended the ED to ED  "transfer.    On discussion with patient besides fatigue and occasional cough and some shortness of breath with exertion that is been occurring since [place] he denies any other symptoms such as chest pain, nausea vomiting, fevers chills myalgias and or unexpected weight loss he has not had appetite changes denies any bone or extremity pain also denies any bowel symptoms and or lower urinary tract symptoms.  States he has seen part of his imaging at the prior hospital offered to review imaging with patient this evening, discussed plan for steroids, seizure medications and anticoagulation      * No surgery found *      Hospital Course:   ED treatment, Decadron 8 mg IV prior to arrival, Keppra 1 g IV. Patient admitted to hospital medicine. NSGY, Medical oncology consulted. MRI brain with "1. Multiple cerebral and cerebellar masses consistent with metastatic disease.  The largest is in the right temporal lobe measuring approximately 3.7 cm.  Mild associated vasogenic edema. 2. Multiple small enhancing masses in the scalp and posterior paraspinal musculature in the upper neck suggesting metastatic disease." NSGY rec IR consult for biopsy but IR deferred to outpatient. No intervention per NSGY. Rad/Onc consulted for evaluation WBR and rec to follow up after tissue diagnosis along with med onc. Continued on keppra, decadron, lovenox. CM consulted to assist patient obtaining insurance. Dermatology consulted for biopsy scalp mass which patient underwent 1/27, path pending. Patient felt well and had no complaints. Patient deemed ready for discharge with plan for outpatient follow-up to follow up pending path results. Plan discussed with pt, who was agreeable and amenable; medications were discussed and reviewed, outpatient follow-up arranged, ER precautions were given, all questions were answered to the pt's satisfaction, and Daryle Lynn Howard  was subsequently discharged.         Goals of Care Treatment Preferences:  Code " Status: Full Code      Consults:   Consults (From admission, onward)        Status Ordering Provider     Inpatient consult to Dermatology  Once        Provider:  (Not yet assigned)    Completed PAU DAMON     Inpatient consult to Pulmonology  Once        Provider:  (Not yet assigned)    Completed PAU DAMON     Inpatient consult to Radiation Oncology  Once        Provider:  (Not yet assigned)    Completed RAYMOND DOYLE     Inpatient consult to Radiation Oncology  Once        Provider:  (Not yet assigned)    Completed PAU DAMON     Inpatient consult to Hematology/Oncology  Once        Provider:  (Not yet assigned)    Completed GISELLE MACK     Inpatient consult to Interventional Radiology  Once        Provider:  (Not yet assigned)    Completed GISELLE MACK     Inpatient consult to Neurosurgery  Once        Provider:  (Not yet assigned)    Completed FREDIS RÍOS          No new Assessment & Plan notes have been filed under this hospital service since the last note was generated.  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           Nikki Hull NP Follow up on 3/1/2023.    Why: 8:30 AM  Contact information:  Internal Med [facility] [address] [address] [phone]                     Patient Instructions:      Ambulatory referral/consult to Hematology / Oncology   Standing Status: Future   Referral Priority: Routine Referral Type: Consultation   Referral Reason: Specialty Services Required   Requested Specialty: Hematology and Oncology   Number of Visits Requested: 1     Ambulatory referral/consult to Neurosurgery   Standing Status: Future   Referral Priority: Routine Referral Type: Consultation   Referral Reason: Specialty Services Required   Requested Specialty: Neurosurgery   Number of Visits Requested: 1     Notify your health care provider if you experience any of the following:  temperature >100.4     Notify your health care provider if you experience any of the following:  persistent nausea and vomiting or diarrhea     Notify your health care provider if you experience any of the following:  severe uncontrolled pain     Notify your health care provider if you experience any of the following:  redness, tenderness, or signs of infection (pain, swelling, redness, odor or green/yellow discharge around incision site)     Notify your health care provider if you experience any of the following:  difficulty breathing or increased cough     Notify your health care provider if you experience any of the following:  severe persistent headache     Notify your health care provider if you experience any of the following:  worsening rash     Notify your health care provider if you experience any of the following:  persistent dizziness, light-headedness, or visual disturbances     Notify your health care provider if you experience any of the following:  increased confusion or weakness     Activity as tolerated         Pending Diagnostic Studies:     Procedure Component Value Units  Date/Time    Specimen to Pathology, Surgery Dermatology and skin neoplasms [[id number]] Collected: 01/27/23 1506    Order Status: Sent Lab Status: In process Updated: 01/27/23 1641    Specimen: Tissue          Medications:  Reconciled Home Medications:      Medication List      START taking these medications    benzonatate 100 MG capsule  Commonly known as: TESSALON  Take 1 capsule (100 mg total) by mouth 3 (three) times daily as needed for Cough.     dexAMETHasone 2 MG tablet  Commonly known as: DECADRON  Take 1 tablet (2 mg total) by mouth every 12 (twelve) hours. Until oncology follow up for de-escalation     ELIQUIS 5 mg Tab  Generic drug: apixaban  Take 2 tablets (10 mg total) by mouth 2 (two) times daily for 7 days, THEN 1 tablet (5 mg total) 2 (two) times daily.  Start taking on: January 28, 2023     famotidine 20 MG tablet  Commonly known as: PEPCID  Take 1 tablet (20 mg total) by mouth 2 (two) times daily.     levETIRAcetam 500 MG Tab  Commonly known as: KEPPRA  Take 1 tablet (500 mg total) by mouth 2 (two) times daily.            Indwelling Lines/Drains at time of discharge:   Lines/Drains/Airways     None                 Time spent on the discharge of patient: 35 minutes         Ba Lopez MD  Department of Hospital Medicine  [facility] Neurosurgery ([facility]

## 2023-01-28 NOTE — PLAN OF CARE
Problem: Adult Inpatient Plan of Care  Goal: Plan of Care Review  Outcome: Adequate for Care Transition  Goal: Patient-Specific Goal (Individualized)  Outcome: Adequate for Care Transition  Goal: Absence of Hospital-Acquired Illness or Injury  Outcome: Adequate for Care Transition  Goal: Optimal Comfort and Wellbeing  Outcome: Adequate for Care Transition  Goal: Readiness for Transition of Care  Outcome: Adequate for Care Transition     Problem: Infection  Goal: Absence of Infection Signs and Symptoms  Outcome: Adequate for Care Transition

## 2023-01-28 NOTE — PROGRESS NOTES
Patient received discharge orders. Discharge instructions reviewed with family and patient. Patent demonstrated understanding of discharge instructions. IV and tele removed. Prescriptions given to patient. Waiting for transportation to be discharged home with family. Gem.

## 2023-01-30 ENCOUNTER — NURSE TRIAGE (OUTPATIENT)
Dept: ADMINISTRATIVE | Facility: CLINIC | Age: 63
End: 2023-01-30

## 2023-01-30 LAB — PATH REV BLD -IMP: NORMAL

## 2023-01-31 DIAGNOSIS — U07.1 COVID-19 VIRUS DETECTED: ICD-10-CM

## 2023-01-31 NOTE — TELEPHONE ENCOUNTER
Was diagnosed with lesions on his brain and lungs. Test positive for COVID.  Reason for Disposition   MODERATE difficulty breathing (e.g., speaks in phrases, SOB even at rest, pulse 100-120)    Additional Information   Negative: SEVERE difficulty breathing (e.g., struggling for each breath, speaks in single words)   Negative: Difficult to awaken or acting confused (e.g., disoriented, slurred speech)   Negative: Bluish (or gray) lips or face now   Negative: Shock suspected (e.g., cold/pale/clammy skin, too weak to stand, low BP, rapid pulse)   Negative: Sounds like a life-threatening emergency to the triager   Negative: [1] Diagnosed or suspected COVID-19 AND [2] symptoms lasting 3 or more weeks   Negative: [1] COVID-19 exposure AND [2] no symptoms   Negative: COVID-19 vaccine reaction suspected (e.g., fever, headache, muscle aches) occurring 1 to 3 days after getting vaccine   Negative: COVID-19 vaccine, questions about   Negative: [1] Lives with someone known to have influenza (flu test positive) AND [2] flu-like symptoms (e.g., cough, runny nose, sore throat, SOB; with or without fever)   Negative: [1] Adult with possible COVID-19 symptoms AND [2] triager concerned about severity of symptoms or other causes   Negative: COVID-19 and breastfeeding, questions about   Negative: SEVERE or constant chest pain or pressure  (Exception: Mild central chest pain, present only when coughing.)    Protocols used: Coronavirus (COVID-19) Diagnosed or [id number]

## 2023-02-02 LAB
COMMENT: NORMAL
FINAL PATHOLOGIC DIAGNOSIS: NORMAL
GROSS: NORMAL
Lab: NORMAL
MICROSCOPIC EXAM: NORMAL

## 2023-02-03 NOTE — PLAN OF CARE
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 Patient called and updated regarding these results and the need to follow-up promptly for further care. He wished to follow-up at [facility] due to proximity to home with the navigator at [facility] contacted to help him establish care. Patient voiced understanding of his diagnosis and the overall plan of care.      Christian Raphael D.O.  Hematology/Oncology Fellow, PGY-IV

## 2023-02-07 PROBLEM — C79.89: Status: ACTIVE | Noted: 2023-02-07

## 2023-02-27 ENCOUNTER — TELEPHONE (OUTPATIENT)
Dept: HEPATOLOGY | Facility: CLINIC | Age: 63
End: 2023-02-27

## 2023-02-27 NOTE — TELEPHONE ENCOUNTER
I spoke to the pharmacy.  I gave the DX code for the Pepcid which was the only script pending refill.  These scripts were transferred from the main campus pharmacy and this one was missing an ICD code on the original script.

## 2023-02-27 NOTE — TELEPHONE ENCOUNTER
----- Message from Kassandra Pickard sent at 2/27/2023 11:54 AM CST -----  Contact: [phone]  Type: Rx    Name of medication(s): apixaban (ELIQUIS) 5 mg Tab and famotidine (PEPCID) 20 MG tablet    Is this a refill? New rx?    Who prescribed medication? Dr Lopez    Pharmacy Name, Phone, & Location: [address] [address]      Comments: states that pharmacy is not giving them the refills because is missing codes

## 2023-04-25 ENCOUNTER — PATIENT MESSAGE (OUTPATIENT)
Dept: RESEARCH | Facility: HOSPITAL | Age: 63
End: 2023-04-25

## 2023-05-01 PROBLEM — I26.99 OTHER PULMONARY EMBOLISM WITHOUT ACUTE COR PULMONALE: Status: RESOLVED | Noted: 2023-01-23 | Resolved: 2023-05-01

## 2023-05-02 ENCOUNTER — PATIENT MESSAGE (OUTPATIENT)
Dept: RESEARCH | Facility: HOSPITAL | Age: 63
End: 2023-05-02

## 2023-05-09 ENCOUNTER — PATIENT OUTREACH (OUTPATIENT)
Dept: ADMINISTRATIVE | Facility: HOSPITAL | Age: 63
End: 2023-05-09

## 2024-02-07 ENCOUNTER — OFFICE VISIT (OUTPATIENT)
Dept: OTOLARYNGOLOGY | Facility: CLINIC | Age: 64
End: 2024-02-07
Payer: MEDICAID

## 2024-02-07 ENCOUNTER — CLINICAL SUPPORT (OUTPATIENT)
Dept: AUDIOLOGY | Facility: CLINIC | Age: 64
End: 2024-02-07
Payer: MEDICAID

## 2024-02-07 VITALS — HEIGHT: 67 IN | BODY MASS INDEX: 23.23 KG/M2 | WEIGHT: 148 LBS

## 2024-02-07 DIAGNOSIS — H61.23 BILATERAL IMPACTED CERUMEN: ICD-10-CM

## 2024-02-07 DIAGNOSIS — H90.3 ASYMMETRICAL SENSORINEURAL HEARING LOSS: Primary | ICD-10-CM

## 2024-02-07 DIAGNOSIS — H90.3 SENSORINEURAL HEARING LOSS (SNHL) OF BOTH EARS: ICD-10-CM

## 2024-02-07 PROCEDURE — 3008F BODY MASS INDEX DOCD: CPT | Mod: CPTII,S$GLB,, | Performed by: NURSE PRACTITIONER

## 2024-02-07 PROCEDURE — 92557 COMPREHENSIVE HEARING TEST: CPT | Mod: S$GLB,,,

## 2024-02-07 PROCEDURE — 92550 TYMPANOMETRY & REFLEX THRESH: CPT | Mod: S$GLB,,,

## 2024-02-07 PROCEDURE — 1159F MED LIST DOCD IN RCRD: CPT | Mod: CPTII,S$GLB,, | Performed by: NURSE PRACTITIONER

## 2024-02-07 PROCEDURE — 99203 OFFICE O/P NEW LOW 30 MIN: CPT | Mod: 25,S$GLB,, | Performed by: NURSE PRACTITIONER

## 2024-02-07 PROCEDURE — 69210 REMOVE IMPACTED EAR WAX UNI: CPT | Mod: S$GLB,,, | Performed by: NURSE PRACTITIONER

## 2024-02-07 NOTE — PROGRESS NOTES
OTOLARYNGOLOGY CLINIC NOTE  Date:  02/07/2024     Chief complaint:  Chief Complaint   Patient presents with    Cerumen Impaction    Hearing Loss     History of Present Illness  Daryle Lynn Howard is a 64 y.o. male  presenting today for a new evaluation and treatment of hearing loss.  Pt reports his hearing started to decline after starting chemotherapy.  Pt reports his hearing was normal prior to treatments but now he has difficulty hearing and understanding what people are saying.  Pt denies any pain to his ears or seeing any drainage from his ears.  Pt denies any recent history of noise exposure.      Review of medical records and prior documentation  Past medical records were reviewed with data pertinent to the chief complaint summarized in the HPI. Information obtained from review of medical records is attributed to respective sources in the HPI with reference to sources of information at their mention. Records reviewed included all recent notes from referring provider, primary care, and related subspecialty evaluations as available. This review of records was performed and additional data obtained to supplement history obtained from the patient and further inform medical decision making involved in formulating a plan of care accounting for all history and treatment relevant to the issues addressed.    Past Medical History  Past Medical History:   Diagnosis Date    Cancer       Past Surgical History  No past surgical history on file.     Medications  Current Outpatient Medications on File Prior to Visit   Medication Sig Dispense Refill    acetaminophen (TYLENOL) 500 MG tablet Take 500 mg by mouth.      albuterol (VENTOLIN HFA) 90 mcg/actuation inhaler Inhale 2 puffs into the lungs every 6 (six) hours as needed for Wheezing. Rescue 18 g 0    apixaban (ELIQUIS) 5 mg Tab Take 1 tablet (5 mg total) by mouth 2 (two) times daily. 60 tablet 0    CMPD hydrocortisone 2%- LIDOcaine 3% suppository (RECTAL ROCKET) Place 1  "suppository rectally every evening. Insert 1 suppository 3/4 of the way onto rectum. Wet for easier application. Repeat for 3 nights. 15 each 1    cyproheptadine (PERIACTIN) 4 mg tablet TAKE 1/2 (ONE-HALF) TABLET BY MOUTH 4 TIMES DAILY FOR 7 DAYS . (CUT TABLETS IN HALF)      hydrocortisone 2.5 % cream Apply topically 2 (two) times daily. 28 g 1    memantine (NAMENDA) 10 MG Tab TAKE 1 TABLET BY MOUTH IN THE MORNING AND 1 TABLET IN THE EVENING .      memantine (NAMENDA) 5 MG Tab TAKE 1 TABLET BY MOUTH IN THE MORNING FOR 7 DAYS THEN 1 IN THE MORNING AND 1 IN THE EVENING FOR 7 DAYS THEN 2 IN THE MORNING AND 1 IN THE EVENING FOR 7 DAYS .      ondansetron (ZOFRAN-ODT) 8 MG TbDL DISSOLVE 1 TABLET IN MOUTH EVERY 6 HOURS AS NEEDED .      famotidine (PEPCID) 20 MG tablet Take 1 tablet (20 mg total) by mouth 2 (two) times daily. 60 tablet 11    levETIRAcetam (KEPPRA) 500 MG Tab Take 1 tablet (500 mg total) by mouth 2 (two) times daily. 60 tablet 11     No current facility-administered medications on file prior to visit.     Review of Systems  Review of Systems   Constitutional: Negative.    HENT:  Positive for hearing loss.    Eyes: Negative.    Respiratory: Negative.     Cardiovascular: Negative.    Gastrointestinal: Negative.    Genitourinary: Negative.    Musculoskeletal: Negative.    Skin: Negative.    Neurological: Negative.    Psychiatric/Behavioral: Negative.        Social History   reports that he has quit smoking. His smoking use included cigarettes. He has never used smokeless tobacco. He reports that he does not currently use alcohol. He reports that he does not currently use drugs after having used the following drugs: Marijuana.     Family History  Family History   Problem Relation Age of Onset    Diverticulitis Mother     Prostate cancer Brother         Physical Exam   There were no vitals filed for this visit. Body mass index is 23.18 kg/m².  Weight: 67.1 kg (148 lb)   Height: 5' 7" (170.2 cm)     GENERAL: no " acute distress.  HEAD: normocephalic.   EYES: lids and lashes normal. No scleral icterus  EARS: external ear without lesion, normal pinna shape and position.  External auditory canal withl cerumen impaction.   NOSE: external nose without significant bony abnormality  ORAL CAVITY/OROPHARYNX: tongue midline and mobile. Symmetric palate rise. Uvula midline.   NECK: trachea midline.   LYMPH NODES:No cervical lymphadenopathy.  RESPIRATORY: no stridor, no stertor. Voice normal. Respirations nonlabored.  NEURO: alert, responds to questions appropriately.   Cranial nerve exam as indicated in above sections and additionally showed facial movement symmetric with good eye closure and symmetric smile.   PSYCH:mood appropriate    Procedure Note   Procedure performed:microscopic examination of ears with cerumen disimpaction    Indication for procedure: bilateral cerumen impaction     Description of procedure:  After verbal consent was obtained, the patient was positioned in semi recumbent position and speculum was placed in the right ear and microscope brought into the field.  The microscope was positioned and magnification adjusted for appropriate visualization. Otologic instruments including various size otologic suctions and curette were used to remove the cerumen from the right external auditory canals under visualization with the operating microscope. After cleaning, visualization was again performed and at various levels of higher magnification to optimize views of the ear canal, tympanic membrane, ossicles and middle ear space. The same procedure was then repeated for the left ear. Findings as indicated below. All portions of the procedure and examination by otomicroscopy were tolerated well without complication.     Findings:  Right ear: Complete cerumen impaction removed entirely revealing normal external auditory canal; tympanic membrane without bulging, retraction, or perforation; no evidence of middle ear fluid or  effusion.   Left ear: Complete cerumen impaction removed entirely revealing normal external auditory canal; tympanic membrane without bulging, retraction, or perforation; no evidence of middle ear fluid or effusion.     Imaging:  The patient does not have any pertinent and/or recent imaging of the head and neck.     Labs:  CBC  Recent Labs   Lab 01/30/23  2246 02/07/23  0911 03/02/23  1406   WBC 10.85 22.76 H 8.55   Hemoglobin 11.4 L 12.2 L 11.9 L   Hematocrit 35.8 L 38.3 L 37.6 L   MCV 92 93 96   Platelets 311 341 224     BMP  Recent Labs   Lab 01/26/23  0445 01/27/23  0427 01/29/23  1542 01/30/23  2246 02/07/23  0911 03/02/23  1406   Glucose 134 H 147 H  --  105 215 H 71   Sodium 137 141   < > 137 141 138   Potassium 4.7 4.9   < > 5.5 H 4.6 4.1   Chloride 105 105  --  102 101 102   CO2 22 L 25  --  23 27 26   Carbon Dioxide  --   --    < >  --   --   --    BUN 16 21   < > 25 H 24 H 26 H   Creatinine 1.0 1.4   < > 1.1 1.2 1.1   Calcium 10.1 9.8   < > 9.0 9.7 9.1   Phosphorus 2.9 3.0  --   --  3.0  --    Magnesium 1.8 1.9  --   --  1.8  --     < > = values in this interval not displayed.     COAGS  Recent Labs   Lab 01/25/23  1518   INR 1.1         AUDIOLOGY RESULTS  Audiometric evaluation including audiogram, tympanometry, acoustic reflexes, and speech discrimination which was performed  was personally reviewed and interpreted.  Notable findings on the audiogram were mild sloping to profound mid to high frequency sensorineural hearing loss (SNHL) for the right ear and mid sloping to severe mid to high frequency SNHL for the left ear.  Tympanometry revealed Type A tympanogram on the left and Type A tympanogram on the right. Speech discrimination was 68%  on the left, and 52% on the right. Report of the audiologist performing this audiometric testing was also reviewed.     Assessment  1. Asymmetrical sensorineural hearing loss    2. Sensorineural hearing loss (SNHL) of both ears  - CT Temporal Bone without contrast;  Future     Plan:  ASNHL:  Audiogram reviewed and discussed with patient. Will obtain CT to check temporal bone and advise pt of any additional treatment needed.   Recheck hearing  once evaluation complete will determine when needed and will clear for hearing aids at that time.  Discussed plan of care with patient in detail and all questions answered. Patient reported understanding of plan of care.

## 2024-02-07 NOTE — PROGRESS NOTES
Please click on link to view Audiogram:  Document on 2/7/2024 3:06 PM by Maggie Cowan AU.D: Audiogram    Mr. Daryle Lynn Howard, a 64 y.o. male, was seen in the clinic today for an audiological evaluation. Mr. Foley's main complaint was bilateral hearing loss.    Bilateral cerumen impaction noted upon otoscopic inspection. Continued audiological evaluation after patient was seen for cerumen management.    Tympanometry testing revealed a Type As tympanogram for the right ear and a Type As tympanogram for the left ear. Ipsilateral acoustic reflexes were absent at all tested frequencies bilaterally.     Audiological testing revealed a mild sloping to profound mid to high frequency sensorineural hearing loss (SNHL) for the right ear and a mild sloping to severe mid to high frequency SNHL for the left ear. Conductive component noted at 1000 Hz for the right ear. A speech reception threshold was obtained at 40 dBHL for the right ear and at 35 dBHL for the left ear. Speech discrimination was 52% for the right ear and 68% for the left ear. Significant asymmetry noted for pure tone thresholds and speech discrimination, worse for the right ear.    Recommendations:  1. Otologic evaluation  2. Annual audiological evaluation or sooner if hearing changes  3. Hearing protection when in noise   4. Hearing aid consultation following medical clearance

## 2024-02-27 ENCOUNTER — HOSPITAL ENCOUNTER (OUTPATIENT)
Dept: RADIOLOGY | Facility: HOSPITAL | Age: 64
Discharge: HOME | End: 2024-02-27
Attending: NURSE PRACTITIONER
Payer: MEDICAID

## 2024-02-27 DIAGNOSIS — H90.3 SENSORINEURAL HEARING LOSS (SNHL) OF BOTH EARS: ICD-10-CM

## 2024-02-27 PROCEDURE — 70480 CT ORBIT/EAR/FOSSA W/O DYE: CPT | Mod: TC

## 2024-02-27 PROCEDURE — 70480 CT ORBIT/EAR/FOSSA W/O DYE: CPT | Mod: 26,,, | Performed by: RADIOLOGY

## 2024-04-04 ENCOUNTER — TELEPHONE (OUTPATIENT)
Dept: OTOLARYNGOLOGY | Facility: CLINIC | Age: 64
End: 2024-04-04
Payer: MEDICAID

## 2024-04-04 NOTE — TELEPHONE ENCOUNTER
----- Message from MELODY Schrader sent at 4/4/2024 10:24 AM CDT -----  Regarding: FW: CT results  Please forward results to Oncology at [facility]   ----- Message -----  From: Carroll Vera II, NP  Sent: 3/13/2024   9:57 AM CDT  To: MELODY Schrader; [facility] Staff  Subject: RE: CT results                                   Patient is seeing Oncology at [facility]  Can we please forward the CT results to them, though it is likely that they are aware given the unchanged nature of the findings.  Thank you  ----- Message -----  From: Ga Skelton FNP  Sent: 3/12/2024   2:59 PM CDT  To: Carroll Vera II, NP  Subject: CT results                                       Please review CT results given additional findings found.      Thanks,   MELODY Mcfadden  ----- Message -----  From: Interface, Rad Results In  Sent: 2/28/2024   9:17 AM CDT  To: MELODY Schrader

## 2024-04-04 NOTE — TELEPHONE ENCOUNTER
----- Message from Vita Jacobs sent at 4/4/2024 11:48 AM CDT -----  Regarding: Lady [phone]  Type: Patient Call Back    Who called: Lady     What is the request in detail: stated the Oncologist at [facility] is Dr. BOBBY Gotti at [phone]     Can the clinic reply by [facility]? no    Would the patient rather a call back or a response via [facility]?  Call back     Best call back number: [phone]

## 2024-06-06 ENCOUNTER — TELEPHONE (OUTPATIENT)
Dept: OTOLARYNGOLOGY | Facility: CLINIC | Age: 64
End: 2024-06-06
Payer: MEDICAID

## 2024-06-06 NOTE — TELEPHONE ENCOUNTER
----- Message from Clau Jansen sent at 6/6/2024  3:08 PM CDT -----  Regarding: Karen (sister)  Type: Patient Call Back     Who called: Kraen (sister)     What is the request in detail: called on behalf of pt to have 6/7 appt r/s after cancelling but [facility] did not load any appointments     Can the clinic reply by [facility]?-No     Would the patient rather a call back or a response via [facility]?-Call back     Best call back number: [phone] (pt's phone number)

## 2024-06-24 ENCOUNTER — OFFICE VISIT (OUTPATIENT)
Dept: OTOLARYNGOLOGY | Facility: CLINIC | Age: 64
End: 2024-06-24
Payer: MEDICAID

## 2024-06-24 DIAGNOSIS — H61.23 BILATERAL IMPACTED CERUMEN: Primary | ICD-10-CM

## 2024-06-24 PROCEDURE — 69210 REMOVE IMPACTED EAR WAX UNI: CPT | Mod: S$GLB,,, | Performed by: NURSE PRACTITIONER

## 2024-06-24 PROCEDURE — 99499 UNLISTED E&M SERVICE: CPT | Mod: S$GLB,,, | Performed by: NURSE PRACTITIONER

## 2024-10-24 ENCOUNTER — OFFICE VISIT (OUTPATIENT)
Dept: OTOLARYNGOLOGY | Facility: CLINIC | Age: 64
End: 2024-10-24
Payer: MEDICAID

## 2024-10-24 VITALS
WEIGHT: 147.94 LBS | BODY MASS INDEX: 23.22 KG/M2 | DIASTOLIC BLOOD PRESSURE: 71 MMHG | HEIGHT: 67 IN | SYSTOLIC BLOOD PRESSURE: 117 MMHG

## 2024-10-24 DIAGNOSIS — H61.23 BILATERAL IMPACTED CERUMEN: Primary | ICD-10-CM

## 2024-10-24 PROCEDURE — 69210 REMOVE IMPACTED EAR WAX UNI: CPT | Mod: S$GLB,,, | Performed by: NURSE PRACTITIONER

## 2024-10-24 PROCEDURE — 99499 UNLISTED E&M SERVICE: CPT | Mod: S$GLB,,, | Performed by: NURSE PRACTITIONER

## 2024-10-24 NOTE — PROGRESS NOTES
Procedure Note   Procedure performed:microscopic examination of ears with cerumen disimpaction    Indication for procedure: bilateral cerumen impaction     Description of procedure:  After verbal consent was obtained, the patient was positioned in semi recumbent position and speculum was placed in the right ear and microscope brought into the field.  The microscope was positioned and magnification adjusted for appropriate visualization. Otologic instruments including various size otologic suctions and curette were used to remove the cerumen from the right external auditory canals under visualization with the operating microscope. After cleaning, visualization was again performed and at various levels of higher magnification to optimize views of the ear canal, tympanic membrane, ossicles and middle ear space. The same procedure was then repeated for the left ear. Findings as indicated below. All portions of the procedure and examination by otomicroscopy were tolerated well without complication.     Findings:  Right ear: Complete cerumen impaction removed entirely revealing normal external auditory canal; tympanic membrane without bulging, retraction, or perforation; no evidence of middle ear fluid or effusion.   Left ear: Complete cerumen impaction removed entirely revealing normal external auditory canal; tympanic membrane without bulging, retraction, or perforation; no evidence of middle ear fluid or effusion.